# Patient Record
Sex: MALE | Race: WHITE | NOT HISPANIC OR LATINO | Employment: OTHER | URBAN - METROPOLITAN AREA
[De-identification: names, ages, dates, MRNs, and addresses within clinical notes are randomized per-mention and may not be internally consistent; named-entity substitution may affect disease eponyms.]

---

## 2017-01-25 ENCOUNTER — APPOINTMENT (OUTPATIENT)
Dept: PHYSICAL THERAPY | Facility: CLINIC | Age: 52
End: 2017-01-25
Payer: COMMERCIAL

## 2017-01-25 PROCEDURE — 97161 PT EVAL LOW COMPLEX 20 MIN: CPT

## 2017-01-27 ENCOUNTER — APPOINTMENT (OUTPATIENT)
Dept: PHYSICAL THERAPY | Facility: CLINIC | Age: 52
End: 2017-01-27
Payer: COMMERCIAL

## 2017-01-27 PROCEDURE — 97012 MECHANICAL TRACTION THERAPY: CPT

## 2017-01-27 PROCEDURE — 97140 MANUAL THERAPY 1/> REGIONS: CPT

## 2017-01-27 PROCEDURE — 97110 THERAPEUTIC EXERCISES: CPT

## 2017-01-30 ENCOUNTER — APPOINTMENT (OUTPATIENT)
Dept: PHYSICAL THERAPY | Facility: CLINIC | Age: 52
End: 2017-01-30
Payer: COMMERCIAL

## 2017-01-30 PROCEDURE — 97012 MECHANICAL TRACTION THERAPY: CPT

## 2017-01-30 PROCEDURE — 97140 MANUAL THERAPY 1/> REGIONS: CPT

## 2017-01-30 PROCEDURE — 97110 THERAPEUTIC EXERCISES: CPT

## 2017-02-01 ENCOUNTER — APPOINTMENT (OUTPATIENT)
Dept: PHYSICAL THERAPY | Facility: CLINIC | Age: 52
End: 2017-02-01
Payer: COMMERCIAL

## 2017-02-01 PROCEDURE — 97110 THERAPEUTIC EXERCISES: CPT

## 2017-02-01 PROCEDURE — 97012 MECHANICAL TRACTION THERAPY: CPT

## 2017-02-01 PROCEDURE — 97140 MANUAL THERAPY 1/> REGIONS: CPT

## 2017-02-03 ENCOUNTER — APPOINTMENT (OUTPATIENT)
Dept: PHYSICAL THERAPY | Facility: CLINIC | Age: 52
End: 2017-02-03
Payer: COMMERCIAL

## 2017-02-03 PROCEDURE — 97110 THERAPEUTIC EXERCISES: CPT

## 2017-02-03 PROCEDURE — 97140 MANUAL THERAPY 1/> REGIONS: CPT

## 2017-02-03 PROCEDURE — 97014 ELECTRIC STIMULATION THERAPY: CPT

## 2017-02-03 PROCEDURE — G0283 ELEC STIM OTHER THAN WOUND: HCPCS

## 2017-02-06 ENCOUNTER — APPOINTMENT (OUTPATIENT)
Dept: PHYSICAL THERAPY | Facility: CLINIC | Age: 52
End: 2017-02-06
Payer: COMMERCIAL

## 2017-02-06 PROCEDURE — 97014 ELECTRIC STIMULATION THERAPY: CPT

## 2017-02-06 PROCEDURE — G0283 ELEC STIM OTHER THAN WOUND: HCPCS

## 2017-02-06 PROCEDURE — 97140 MANUAL THERAPY 1/> REGIONS: CPT

## 2017-02-06 PROCEDURE — 97110 THERAPEUTIC EXERCISES: CPT

## 2017-02-07 ENCOUNTER — GENERIC CONVERSION - ENCOUNTER (OUTPATIENT)
Dept: OTHER | Facility: OTHER | Age: 52
End: 2017-02-07

## 2017-02-08 ENCOUNTER — APPOINTMENT (OUTPATIENT)
Dept: PHYSICAL THERAPY | Facility: CLINIC | Age: 52
End: 2017-02-08
Payer: COMMERCIAL

## 2017-02-10 ENCOUNTER — APPOINTMENT (OUTPATIENT)
Dept: PHYSICAL THERAPY | Facility: CLINIC | Age: 52
End: 2017-02-10
Payer: COMMERCIAL

## 2017-02-10 PROCEDURE — G0283 ELEC STIM OTHER THAN WOUND: HCPCS

## 2017-02-10 PROCEDURE — 97140 MANUAL THERAPY 1/> REGIONS: CPT

## 2017-02-10 PROCEDURE — 97014 ELECTRIC STIMULATION THERAPY: CPT

## 2017-02-10 PROCEDURE — 97110 THERAPEUTIC EXERCISES: CPT

## 2017-02-13 ENCOUNTER — APPOINTMENT (OUTPATIENT)
Dept: PHYSICAL THERAPY | Facility: CLINIC | Age: 52
End: 2017-02-13
Payer: COMMERCIAL

## 2017-02-13 PROCEDURE — 97140 MANUAL THERAPY 1/> REGIONS: CPT

## 2017-02-13 PROCEDURE — 97014 ELECTRIC STIMULATION THERAPY: CPT

## 2017-02-13 PROCEDURE — G0283 ELEC STIM OTHER THAN WOUND: HCPCS

## 2017-02-13 PROCEDURE — 97110 THERAPEUTIC EXERCISES: CPT

## 2017-02-15 ENCOUNTER — APPOINTMENT (OUTPATIENT)
Dept: PHYSICAL THERAPY | Facility: CLINIC | Age: 52
End: 2017-02-15
Payer: COMMERCIAL

## 2017-02-15 PROCEDURE — 97110 THERAPEUTIC EXERCISES: CPT

## 2017-02-15 PROCEDURE — 97014 ELECTRIC STIMULATION THERAPY: CPT

## 2017-02-15 PROCEDURE — 97140 MANUAL THERAPY 1/> REGIONS: CPT

## 2017-02-15 PROCEDURE — G0283 ELEC STIM OTHER THAN WOUND: HCPCS

## 2017-02-17 ENCOUNTER — APPOINTMENT (OUTPATIENT)
Dept: PHYSICAL THERAPY | Facility: CLINIC | Age: 52
End: 2017-02-17
Payer: COMMERCIAL

## 2017-02-17 PROCEDURE — 97140 MANUAL THERAPY 1/> REGIONS: CPT

## 2017-02-17 PROCEDURE — 97110 THERAPEUTIC EXERCISES: CPT

## 2017-02-20 ENCOUNTER — APPOINTMENT (OUTPATIENT)
Dept: PHYSICAL THERAPY | Facility: CLINIC | Age: 52
End: 2017-02-20
Payer: COMMERCIAL

## 2017-02-21 ENCOUNTER — GENERIC CONVERSION - ENCOUNTER (OUTPATIENT)
Dept: OTHER | Facility: OTHER | Age: 52
End: 2017-02-21

## 2017-02-22 ENCOUNTER — APPOINTMENT (OUTPATIENT)
Dept: PHYSICAL THERAPY | Facility: CLINIC | Age: 52
End: 2017-02-22
Payer: COMMERCIAL

## 2017-02-24 ENCOUNTER — APPOINTMENT (OUTPATIENT)
Dept: PHYSICAL THERAPY | Facility: CLINIC | Age: 52
End: 2017-02-24
Payer: COMMERCIAL

## 2017-03-06 ENCOUNTER — GENERIC CONVERSION - ENCOUNTER (OUTPATIENT)
Dept: OTHER | Facility: OTHER | Age: 52
End: 2017-03-06

## 2017-09-22 ENCOUNTER — GENERIC CONVERSION - ENCOUNTER (OUTPATIENT)
Dept: OTHER | Facility: OTHER | Age: 52
End: 2017-09-22

## 2017-09-22 ENCOUNTER — HOSPITAL ENCOUNTER (OUTPATIENT)
Dept: RADIOLOGY | Facility: HOSPITAL | Age: 52
Discharge: HOME | End: 2017-09-22
Payer: COMMERCIAL

## 2017-09-22 ENCOUNTER — TRANSCRIBE ORDERS (OUTPATIENT)
Dept: ADMINISTRATIVE | Facility: HOSPITAL | Age: 52
End: 2017-09-22

## 2017-09-22 DIAGNOSIS — R10.9 ABDOMINAL PAIN: ICD-10-CM

## 2017-09-22 DIAGNOSIS — E55.9 VITAMIN D DEFICIENCY: ICD-10-CM

## 2017-09-22 DIAGNOSIS — R06.83 SNORING: Primary | ICD-10-CM

## 2017-09-22 DIAGNOSIS — R53.83 FATIGUE, UNSPECIFIED TYPE: ICD-10-CM

## 2017-09-22 DIAGNOSIS — R53.83 OTHER FATIGUE: ICD-10-CM

## 2017-09-22 DIAGNOSIS — R07.9 CHEST PAIN: ICD-10-CM

## 2017-09-22 PROCEDURE — 71020 HB CHEST X-RAY 2VW FRONTAL&LATL: CPT

## 2017-09-22 PROCEDURE — 76700 US EXAM ABDOM COMPLETE: CPT

## 2017-09-26 ENCOUNTER — LAB CONVERSION - ENCOUNTER (OUTPATIENT)
Dept: OTHER | Facility: OTHER | Age: 52
End: 2017-09-26

## 2017-09-26 LAB
A/G RATIO (HISTORICAL): 1.5 (CALC) (ref 1–2.5)
ALBUMIN SERPL BCP-MCNC: 4.3 G/DL (ref 3.6–5.1)
ALP SERPL-CCNC: 72 U/L (ref 40–115)
ALT SERPL W P-5'-P-CCNC: 48 U/L (ref 9–46)
AMYLASE (HISTORICAL): 70 U/L (ref 21–101)
AST SERPL W P-5'-P-CCNC: 28 U/L (ref 10–35)
BILIRUB SERPL-MCNC: 0.6 MG/DL (ref 0.2–1.2)
BUN SERPL-MCNC: 13 MG/DL (ref 7–25)
BUN/CREA RATIO (HISTORICAL): ABNORMAL (CALC) (ref 6–22)
CALCIUM SERPL-MCNC: 9.4 MG/DL (ref 8.6–10.3)
CHLORIDE SERPL-SCNC: 103 MMOL/L (ref 98–110)
CO2 SERPL-SCNC: 26 MMOL/L (ref 20–31)
CREAT SERPL-MCNC: 0.89 MG/DL (ref 0.7–1.33)
DEPRECATED RDW RBC AUTO: 13.1 % (ref 11–15)
EGFR AFRICAN AMERICAN (HISTORICAL): 114 ML/MIN/1.73M2
EGFR-AMERICAN CALC (HISTORICAL): 98 ML/MIN/1.73M2
GAMMA GLOBULIN (HISTORICAL): 2.8 G/DL (CALC) (ref 1.9–3.7)
GLUCOSE (HISTORICAL): 101 MG/DL (ref 65–99)
HCT VFR BLD AUTO: 47.9 % (ref 38.5–50)
HGB BLD-MCNC: 16.2 G/DL (ref 13.2–17.1)
LIPASE SERPL-CCNC: 57 U/L (ref 7–60)
MCH RBC QN AUTO: 30.7 PG (ref 27–33)
MCHC RBC AUTO-ENTMCNC: 33.8 G/DL (ref 32–36)
MCV RBC AUTO: 90.9 FL (ref 80–100)
PLATELET # BLD AUTO: 180 THOUSAND/UL (ref 140–400)
PMV BLD AUTO: 10.7 FL (ref 7.5–12.5)
POTASSIUM SERPL-SCNC: 4.5 MMOL/L (ref 3.5–5.3)
RBC # BLD AUTO: 5.27 MILLION/UL (ref 4.2–5.8)
SODIUM SERPL-SCNC: 140 MMOL/L (ref 135–146)
TOTAL PROTEIN (HISTORICAL): 7.1 G/DL (ref 6.1–8.1)
WBC # BLD AUTO: 7.8 THOUSAND/UL (ref 3.8–10.8)

## 2017-09-27 ENCOUNTER — LAB CONVERSION - ENCOUNTER (OUTPATIENT)
Dept: OTHER | Facility: OTHER | Age: 52
End: 2017-09-27

## 2017-09-27 LAB
25(OH)D3 SERPL-MCNC: 26 NG/ML (ref 30–100)
LYME IGG/IGM AB (HISTORICAL): <0.9 INDEX

## 2017-10-03 ENCOUNTER — GENERIC CONVERSION - ENCOUNTER (OUTPATIENT)
Dept: OTHER | Facility: OTHER | Age: 52
End: 2017-10-03

## 2017-10-11 ENCOUNTER — ALLSCRIPTS OFFICE VISIT (OUTPATIENT)
Dept: OTHER | Facility: OTHER | Age: 52
End: 2017-10-11

## 2017-10-13 ENCOUNTER — GENERIC CONVERSION - ENCOUNTER (OUTPATIENT)
Dept: OTHER | Facility: OTHER | Age: 52
End: 2017-10-13

## 2017-10-23 ENCOUNTER — ANESTHESIA (OUTPATIENT)
Dept: GASTROENTEROLOGY | Facility: HOSPITAL | Age: 52
End: 2017-10-23
Payer: COMMERCIAL

## 2017-10-23 ENCOUNTER — ANESTHESIA EVENT (OUTPATIENT)
Dept: GASTROENTEROLOGY | Facility: HOSPITAL | Age: 52
End: 2017-10-23
Payer: COMMERCIAL

## 2017-10-23 ENCOUNTER — HOSPITAL ENCOUNTER (OUTPATIENT)
Facility: HOSPITAL | Age: 52
Setting detail: OUTPATIENT SURGERY
Discharge: HOME | End: 2017-10-23
Attending: INTERNAL MEDICINE | Admitting: INTERNAL MEDICINE
Payer: COMMERCIAL

## 2017-10-23 VITALS
TEMPERATURE: 96.1 F | WEIGHT: 250 LBS | SYSTOLIC BLOOD PRESSURE: 132 MMHG | DIASTOLIC BLOOD PRESSURE: 71 MMHG | BODY MASS INDEX: 35 KG/M2 | HEIGHT: 71 IN | RESPIRATION RATE: 16 BRPM | HEART RATE: 78 BPM | OXYGEN SATURATION: 95 %

## 2017-10-23 DIAGNOSIS — R10.13 EPIGASTRIC PAIN: ICD-10-CM

## 2017-10-23 DIAGNOSIS — K86.1 OTHER CHRONIC PANCREATITIS (HCC): ICD-10-CM

## 2017-10-23 PROCEDURE — 88342 IMHCHEM/IMCYTCHM 1ST ANTB: CPT | Performed by: INTERNAL MEDICINE

## 2017-10-23 PROCEDURE — 88305 TISSUE EXAM BY PATHOLOGIST: CPT | Performed by: INTERNAL MEDICINE

## 2017-10-23 RX ORDER — MIDAZOLAM HYDROCHLORIDE 1 MG/ML
INJECTION INTRAMUSCULAR; INTRAVENOUS AS NEEDED
Status: DISCONTINUED | OUTPATIENT
Start: 2017-10-23 | End: 2017-10-23 | Stop reason: SURG

## 2017-10-23 RX ORDER — FENTANYL CITRATE 50 UG/ML
INJECTION, SOLUTION INTRAMUSCULAR; INTRAVENOUS AS NEEDED
Status: DISCONTINUED | OUTPATIENT
Start: 2017-10-23 | End: 2017-10-23 | Stop reason: SURG

## 2017-10-23 RX ORDER — LIDOCAINE HYDROCHLORIDE 10 MG/ML
INJECTION, SOLUTION INFILTRATION; PERINEURAL AS NEEDED
Status: DISCONTINUED | OUTPATIENT
Start: 2017-10-23 | End: 2017-10-23 | Stop reason: SURG

## 2017-10-23 RX ORDER — PROPOFOL 10 MG/ML
INJECTION, EMULSION INTRAVENOUS CONTINUOUS PRN
Status: DISCONTINUED | OUTPATIENT
Start: 2017-10-23 | End: 2017-10-23 | Stop reason: SURG

## 2017-10-23 RX ORDER — MULTIVITAMIN
1 CAPSULE ORAL DAILY
COMMUNITY

## 2017-10-23 RX ORDER — KETAMINE HYDROCHLORIDE 50 MG/ML
INJECTION, SOLUTION, CONCENTRATE INTRAMUSCULAR; INTRAVENOUS AS NEEDED
Status: DISCONTINUED | OUTPATIENT
Start: 2017-10-23 | End: 2017-10-23 | Stop reason: SURG

## 2017-10-23 RX ORDER — GLYCOPYRROLATE 0.2 MG/ML
INJECTION INTRAMUSCULAR; INTRAVENOUS AS NEEDED
Status: DISCONTINUED | OUTPATIENT
Start: 2017-10-23 | End: 2017-10-23 | Stop reason: SURG

## 2017-10-23 RX ORDER — PROPOFOL 10 MG/ML
INJECTION, EMULSION INTRAVENOUS AS NEEDED
Status: DISCONTINUED | OUTPATIENT
Start: 2017-10-23 | End: 2017-10-23 | Stop reason: SURG

## 2017-10-23 RX ORDER — SODIUM CHLORIDE 9 MG/ML
125 INJECTION, SOLUTION INTRAVENOUS CONTINUOUS
Status: DISCONTINUED | OUTPATIENT
Start: 2017-10-23 | End: 2017-10-23 | Stop reason: HOSPADM

## 2017-10-23 RX ADMIN — FENTANYL CITRATE 25 MCG: 50 INJECTION, SOLUTION INTRAMUSCULAR; INTRAVENOUS at 09:22

## 2017-10-23 RX ADMIN — FENTANYL CITRATE 25 MCG: 50 INJECTION, SOLUTION INTRAMUSCULAR; INTRAVENOUS at 09:10

## 2017-10-23 RX ADMIN — LIDOCAINE HYDROCHLORIDE 50 MG: 10 INJECTION, SOLUTION INFILTRATION; PERINEURAL at 09:11

## 2017-10-23 RX ADMIN — PROPOFOL 100 MCG/KG/MIN: 10 INJECTION, EMULSION INTRAVENOUS at 09:11

## 2017-10-23 RX ADMIN — SODIUM CHLORIDE: 0.9 INJECTION, SOLUTION INTRAVENOUS at 09:10

## 2017-10-23 RX ADMIN — GLYCOPYRROLATE 0.2 MG: 0.2 INJECTION, SOLUTION INTRAMUSCULAR; INTRAVENOUS at 09:11

## 2017-10-23 RX ADMIN — SODIUM CHLORIDE 125 ML/HR: 0.9 INJECTION, SOLUTION INTRAVENOUS at 09:02

## 2017-10-23 RX ADMIN — PROPOFOL 100 MG: 10 INJECTION, EMULSION INTRAVENOUS at 09:11

## 2017-10-23 RX ADMIN — KETAMINE HYDROCHLORIDE 25 MG: 50 INJECTION, SOLUTION INTRAMUSCULAR; INTRAVENOUS at 09:11

## 2017-10-23 RX ADMIN — MIDAZOLAM HYDROCHLORIDE 2 MG: 1 INJECTION, SOLUTION INTRAMUSCULAR; INTRAVENOUS at 09:10

## 2017-10-23 NOTE — OP NOTE
**** GI/ENDOSCOPY REPORT ****     PATIENT NAME: Luz Marina Montesinos - VISIT ID:  Patient ID:   [id number] YOB: 1965     INTRODUCTION: Upper Endoscopic [ULTRASOUND] - A 47 male patient presents   for an outpatient Upper Endoscopic [ULTRASOUND] at [facility]  INDICATIONS: Abdominal pain centered in the epigastrium  GERD  Dysphagia  Hx pancreatitis with CBD stone  CONSENT: The benefits, risks, and alternatives to the procedure were   discussed and informed consent was obtained from the patient  PREPARATION:  EKG, pulse, pulse oximetry and blood pressure were monitored   throughout the procedure  ASA Classification: Class 2 - Patient has mild   to moderate systemic disturbance that may or may not be related to the   disorder requiring surgery  MEDICATIONS: Anesthesia-check records     PROCEDURE:  The ultrasound gastroscope was passed with ease through the   mouth under direct visualization and advanced to the 3rd portion of the   duodenum  The scope was withdrawn and the mucosa was carefully examined  The views were good  The patient's toleration of the procedure was good  FINDINGS: Visual Exam:   Esophagus: Short-segment Soliman's esophagus was   found in the distal third of the esophagus  Multiple biopsies was taken  Multiple biopsies was taken  GE junction: There was a hiatus hernia   visible in the GE junction  Stomach: Multiple biopsies was taken  A   single polyp (measuring 6 mm in size) was found in the antrum  The polyp   was removed by polypectomy with a cold snare  Duodenum: The duodenum   appeared to be normal   EUS EXAM: The CBD and PD were not dilated  There   were no stones in the CBD or PD  There were no lesion in the head, body,   or tail of the pancreas and there was no evidence of chronic pancreatitis  There were no enlarged lymph nodes in the celiac area and no lesions seen   in the examined part of the liver   The gallbladder was surgically absent  COMPLICATIONS: There were no complications  IMPRESSIONS: Soliman's esophagus noted  Multiple biopsies taken  A hiatus   hernia found  A polyp found in the antrum  Polypectomy was performed with   cold snare  Normal duodenum  Normal pancreas and CBD without CBD stone  Surgically absent gallbladder  RECOMMENDATIONS: Anti-reflux measures: Raise the head of the bed 4 to 6   inches  Avoid smoking  Avoid excess coffee, tea or other caffeinated   beverages  Avoid garments that fit tightly through the abdomen  Avoid   eating before bed  Follow-up on the results of the biopsy specimens  Follow-up appointment with Dr Raul Clay  ESTIMATED BLOOD LOSS:     PATHOLOGY SPECIMENS: Multiple biopsies taken  Associated finding:   Soliman's esophagus  Multiple biopsies taken  Multiple biopsies taken  Polypectomy performed using a snare  PROCEDURE CODES:     ICD-9 Codes: 789 06 Abdominal pain, epigastric 530 81 Esophageal reflux   787 2 DYSPHAGIA 530 85 Soliman's esophagus 553 3 Diaphragmatic hernia   without mention of obstruction or gangrene 211 1 Benign neoplasm of stomach     ICD-10 Codes: R10 13 Epigastric pain K21 Gastro-esophageal reflux disease   R13 10 Dysphagia, unspecified K22 7 Soliman's esophagus K44 Diaphragmatic   hernia R56 2 Neoplasm of uncertain behavior of stomach     PERFORMED BY: DENISE Muse  on 10/23/2017  Version 1, electronically signed by DENISE Lindo  on 10/23/2017   at 10:57

## 2017-10-23 NOTE — ANESTHESIA PREPROCEDURE EVALUATION
Review of Systems/Medical History  Patient summary reviewed        Cardiovascular   Pulmonary  Sleep apnea , ,        GI/Hepatic            Endo/Other     GYN       Hematology   Musculoskeletal  Obesity ,        Neurology   Psychology                Anesthesia Plan  ASA Score- 2       Anesthesia Type- IV sedation with anesthesia with ASA Monitors  Additional Monitors:   Airway Plan:           Induction- intravenous  Informed Consent- Anesthetic plan and risks discussed with patient

## 2017-10-23 NOTE — DISCHARGE INSTRUCTIONS
Abdominal Pain   AMBULATORY CARE:   Abdominal pain  can be dull, achy, or sharp  You may have pain in one area of your abdomen, or in your entire abdomen  Your pain may be caused by a condition such as constipation, food sensitivity or poisoning, infection, or a blockage  Abdominal pain can also be from a hernia, appendicitis, or an ulcer  Liver, gallbladder, or kidney conditions can also cause abdominal pain  The cause of your abdominal pain may be unknown  Seek care immediately if:   · You have new chest pain or shortness of breath  · You have pulsing pain in your upper abdomen or lower back that suddenly becomes constant  · Your pain is in the right lower abdominal area and worsens with movement  · You have a fever over 100 4°F (38°C) or shaking chills  · You are vomiting and cannot keep food or liquids down  · Your pain does not improve or gets worse over the next 8 to 12 hours  · You see blood in your vomit or bowel movements, or they look black and tarry  · Your skin or the whites of your eyes turn yellow  · You are a woman and have a large amount of vaginal bleeding that is not your monthly period  Contact your healthcare provider if:   · You have pain in your lower back  · You are a man and have pain in your testicles  · You have pain when you urinate  · You have questions or concerns about your condition or care  Treatment for abdominal pain  may include medicine to calm your stomach, prevent vomiting, or decrease pain  Follow up with your healthcare provider as directed:  Write down your questions so you remember to ask them during your visits  © 2017 [employer]  Information is for End User's use only and may not be sold, redistributed or otherwise used for commercial purposes  All illustrations and images included in CareNotes® are the copyrighted property of [employer]  or [employer] Kauffman    The above information is an educational aid only  It is not intended as medical advice for individual conditions or treatments  Talk to your doctor, nurse or pharmacist before following any medical regimen to see if it is safe and effective for you

## 2017-10-23 NOTE — ANESTHESIA POSTPROCEDURE EVALUATION
Post-Op Assessment Note      CV Status:  Stable    Mental Status:  Alert and awake    Hydration Status:  Euvolemic and stable    PONV Controlled:  None    Airway Patency:  Patent    Post Op Vitals Reviewed: Yes          Staff: CRNA           BP      Temp     Pulse     Resp      SpO2

## 2017-10-31 NOTE — CONSULTS
Assessment  1  Gastroesophageal reflux disease, esophagitis presence not specified (530 81) (K21 9)    Plan  Abdominal pain, epigastric    · NexIUM 24HR 20 MG Oral Capsule Delayed Release (Esomeprazole Magnesium)   Rx By: Brooklynn Payer; Dispense: 20 Days ; #:40 Capsule Delayed Release; Refill: 0;Abdominal pain, epigastric; GREGORY = N; Dispense Sample; Last Updated By: Oscar Balderrama; 10/11/2017 10:23:41 AM  Gastroesophageal reflux disease, esophagitis presence not specified    · 2 - Geena Olsen MD, Rosy Mckoy (Gastroenterology) Co-Management  *  Status: Hold For -Scheduling  Requested for: [id number]   Ordered;Gastroesophageal reflux disease, esophagitis presence not specified; Ordered By: Jilda Nageotte Performed:  Due: 86NCM4417  Care Summary provided  : Yes    Discussion/Summary  Discussion Summary:   79-year-old white male with what sounds like gastroesophageal reflux symptoms  He does have heartburn and stuff coming up to his mouth at night which goes along with reflux disease  He may also have some scarring with the history of food getting stuck  I told the 1st thing to do is get another endoscopy by Dr Geena Olsen who has done his previous scope  Once we get that information will know more what is going on  I told him if there is a stricture that he would probably dilate it at that sitting  I briefly talked to him about what anti-reflux surgery would be  Counseling Documentation With Imm: The patient was counseled regarding  Goals and Barriers: The patient has the current Goals: Identify source of pain and difficulty swallowing  The patent has the current Barriers: None  Patient's Capacity to Self-Care: Patient is able to Self-Care  Medication SE Review and Pt Understands Tx: The treatment plan was reviewed with the patient/guardian   The patient/guardian understands and agrees with the treatment plan   Self Referrals:   Self Referrals: No Referred by Dr Erica Sibley  Chief Complaint Free Text Note Form: Consult epigastric pain  History of Present Illness  HPI: 53-year-old male who is being complaining of epigastric pain  Fairly sharp and then moved around both sites and then feels like a vice  He also notices when he eats or drinks that things seemed to get stuck at about that region  He does notice heartburn and stuff coming up at night  He was started on a PPI by his family doctor and feels a little bit better  No weight loss  Bowel movements are not is normal was that used to be      Review of Systems  Complete-Male:  Constitutional: No fever or chills, feels well, no tiredness, no recent weight gain or weight loss  Eyes: No complaints of eye pain, no red eyes, no discharge from eyes, no itchy eyes  ENT: no complaints of earache, no hearing loss, no nosebleeds, no nasal discharge, no sore throat, no hoarseness  Cardiovascular: No complaints of slow heart rate, no fast heart rate, no chest pain, no palpitations, no leg claudication, no lower extremity  Respiratory: No complaints of shortness of breath, no wheezing, no cough, no SOB on exertion, no orthopnea or PND  Gastrointestinal: as noted in HPI  Genitourinary: No complaints of dysuria, no incontinence, no hesitancy, no nocturia, no genital lesion, no testicular pain  Musculoskeletal: No complaints of arthralgia, no myalgias, no joint swelling or stiffness, no limb pain or swelling  Integumentary: No complaints of skin rash or skin lesions, no itching, no skin wound, no dry skin  Neurological: No compliants of headache, no confusion, no convulsions, no numbness or tingling, no dizziness or fainting, no limb weakness, no difficulty walking  Psychiatric: Is not suicidal, no sleep disturbances, no anxiety or depression, no change in personality, no emotional problems  Endocrine: No complaints of proptosis, no hot flashes, no muscle weakness, no erectile dysfunction, no deepening of the voice, no feelings of weakness  Hematologic/Lymphatic: No complaints of swollen glands, no swollen glands in the neck, does not bleed easily, no easy bruising  Active Problems  1  Abdominal pain (789 00) (R10 9)   2  Abdominal pain, epigastric (789 06) (R10 13)   3  Apnea (786 03) (R06 81)   4  Back pain (724 5) (M54 9)   5  Chest pain (786 50) (R07 9)   6  Chest tightness or pressure (786 59) (R07 89)   7  Cognitive impairment (294 9) (R41 89)   8  Dysphagia (787 20) (R13 10)   9  Fatigue (780 79) (R53 83)   10  Has stopped breathing (786 03) (R06 81)   11  Hyperglycemia (790 29) (R73 9)   12  Sinusitis (473 9) (J32 9)   13  Snoring (786 09) (R06 83)   14  Tick bite (919 4,E906 4) (W57 XXXA)   15  Vitamin D deficiency (268 9) (E55 9)    Past Medical History  1  History of acute pancreatitis (V12 79) (Z87 19)  Active Problems And Past Medical History Reviewed: The active problems and past medical history were reviewed and updated today  Surgical History  1  History of Cholecystectomy  Surgical History Reviewed: The surgical history was reviewed and updated today  Family History  Mother    1  Family history of cardiac disorder (V17 49) (Z82 49)   2  Family history of diabetes mellitus (V18 0) (Z83 3)  Father    3  Family history of    4  Family history of coronary arteriosclerosis (V17 3) (Z82 49)  Family History Reviewed: The family history was reviewed and updated today  Social History     ·    · Full-time employment   · Never A Smoker   · Occasional alcohol use  Social History Reviewed: The social history was reviewed and updated today  Current Meds   1  Gisselle Low Dose 81 MG Oral Tablet Chewable; CHEW AND SWALLOW 1 TABLET DAILY; Therapy: 41ZDL1334 to Recorded   2  Biotin 1 MG Oral Capsule; Therapy: 38WPS2735 to Recorded   3  Magnesium 200 MG Oral Tablet; Therapy: 17PTZ7997 to Recorded   4  Melatonin 1 MG Oral Capsule; Therapy: 63IQJ0927 to Recorded   5   NexIUM 24HR 20 MG Oral Capsule Delayed Release; Take 1 capsule twice daily; Therapy: 93LHY2465 to (77 873 836); Last Rx:[id number] Ordered   6  Potassimin 75 MG Oral Tablet; Therapy: 60BFG2182 to Recorded   7  PriLOSEC OTC 20 MG Oral Tablet Delayed Release; Therapy: 26JOU2570 to Recorded   8  Super B Complex Maxi Oral Tablet; TAKE 1 TABLET DAILY; Therapy: 03JYI0157 to Recorded   9  Vitamin D (Ergocalciferol) 35035 UNIT Oral Capsule; TAKE 1 CAPSULE BY MOUTH WEEKLY; Therapy: 28EQU9609 to (Evaluate:46Yay3299) Recorded    Allergies    1  No Known Drug Allergies    Physical Exam   Constitutional  General appearance: No acute distress, well appearing and well nourished  Eyes  Conjunctiva and lids: No swelling, erythema, or discharge  Ears, Nose, Mouth, and Throat  External inspection of ears and nose: Normal    Neck  Supple, symmetric, trachea midline, no masses  Pulmonary  Respiratory effort: No increased work of breathing or signs of respiratory distress  Auscultation of lungs: Clear to auscultation, equal breath sounds bilaterally, no wheezes, no rales, no rhonci  Cardiovascular  Auscultation of heart: Normal rate and rhythm, normal S1 and S2, without murmurs  Examination of extremities for edema and/or varicosities: Normal    Carotid pulses: Normal    Abdomen  Abdomen: Non-tender, no masses  Liver and spleen: No hepatomegaly or splenomegaly  Lymphatic  Palpation of lymph nodes in neck: No lymphadenopathy  Musculoskeletal  Gait and station: Normal    Neurologic  Sensation: Motor and sensory grossly intact     Psychiatric  Orientation to person, place and time: Normal    Mood and affect: Normal        Signatures   Electronically signed by : Gigi Foley MD; Oct 11 2017 12:13PM EST                       (Author)

## 2017-11-02 ENCOUNTER — GENERIC CONVERSION - ENCOUNTER (OUTPATIENT)
Dept: OTHER | Facility: OTHER | Age: 52
End: 2017-11-02

## 2017-11-03 ENCOUNTER — GENERIC CONVERSION - ENCOUNTER (OUTPATIENT)
Dept: OTHER | Facility: OTHER | Age: 52
End: 2017-11-03

## 2018-01-15 NOTE — RESULT NOTES
Discussion/Summary   Please let him know the polyp in his stomach was an adenoma  Reasonable to repeat EGD in 1 year to check for an remove any additional gastric polyps  Verified Results  (1) TISSUE EXAM 50MBC1995 09:34AM Roro Naidu     Test Name Result Flag Reference   LAB AP CASE REPORT (Report)     Surgical Pathology Report             Case: [id number]                   Authorizing Provider: Jordan Wu MD      Collected:      10/23/2017 0934        Ordering Location:   [address] [place] [address]   Received:      10/23/2017 1316 [facility]                               Pathologist:      Hanane Castro MD                              Specimens:  A) - Stomach, gastric bx-cold                                     B) - Polyp, Stomach/Small Intestine, antrum polyp-cold snare                      C) - Esophagus, distal esophagus bx-cold                                D) - Esophagus, proximal esophagus bx-cold   LAB AP FINAL DIAGNOSIS (Report)     A  Stomach, biopsy:    - Chronic inactive antral gastritis  - Immunostain for H  pylori is negative  - No intestinal metaplasia, dysplasia or neoplasia identified  B  Stomach, antrum, polyp:    - Fundic gland polyp     - Adenomatous polyp     - No intestinal metaplasia, high grade dysplasia or carcinoma   identified  C  Esophagus, distal, biopsy:    - Cardiac gastric and squamous junctional mucosa with mild chronic   inflammation     - No intestinal metaplasia, dysplasia or neoplasia identified  D  Esophagus, proximal, biopsy:    - Squamous esophageal mucosa without significant pathologic   abnormalities  - No intraepithelial eosinophils identified     - No dysplasia or neoplasia identified  Electronically signed by Hanane Castro MD on 11/1/2017 at 4:43 PM   LAB AP SURGICAL ADDITIONAL INFORMATION (Report)     All controls performed with the immunohistochemical stains reported above   reacted appropriately   These tests were developed and their performance   characteristics determined by [facility] or   [employer]  They may not be cleared or approved by the [place]  Food and Drug Administration  The FDA has determined that such clearance   or approval is not necessary  These tests are used for clinical purposes  They should not be regarded as investigational or for research  This   laboratory has been approved by [place] 88, designated as a high-complexity   laboratory and is qualified to perform these tests  - Interpretation performed at [facility], [employer]   LAB AP GROSS DESCRIPTION (Report)     A  The specimen is received in formalin, labeled with the patient's name   and hospital number, and is designated gastric biopsy  The specimen   consists of 3 tan soft tissue fragments measuring 0 1, 0 2, and 0 2 cm in   greatest dimension  Entirely submitted in one cassette  B  The specimen is received in formalin, labeled with the patient's name   and hospital number, and is designated antrum polyp  The specimen   consists of 2 tan soft tissue fragments measuring 0 2 and 0 4 cm in   greatest dimension  Entirely submitted in one cassette  C  The specimen is received in formalin, labeled with the patient's name   and hospital number, and is designated distal esophagus biopsy  The   specimen consists of 2 tan soft tissue fragments measuring 0 2 and 0 4 cm   in greatest dimension  Entirely submitted in one cassette  D  The specimen is received in formalin, labeled with the patient's name   and hospital number, and is designated proximal esophagus biopsy  The   specimen consists of multiple tan-white soft tissue fragments measuring in   loose aggregate 0 5 x 0 2 x 0 1 cm  Entirely submitted in one cassette      Note: The estimated total formalin fixation time based upon information   provided by the submitting clinician and the standard processing schedule   is less than 72 hours      AEK   LAB AP CLINICAL INFORMATION      R/O H Pylori gastritis   R/O H Pylori  gastritis

## 2018-01-22 VITALS
WEIGHT: 251 LBS | HEART RATE: 83 BPM | BODY MASS INDEX: 35.14 KG/M2 | SYSTOLIC BLOOD PRESSURE: 132 MMHG | DIASTOLIC BLOOD PRESSURE: 84 MMHG | TEMPERATURE: 98.1 F | HEIGHT: 71 IN | OXYGEN SATURATION: 96 %

## 2018-04-30 DIAGNOSIS — M54.50 ACUTE RIGHT-SIDED LOW BACK PAIN WITHOUT SCIATICA: Primary | ICD-10-CM

## 2018-05-09 ENCOUNTER — HOSPITAL ENCOUNTER (OUTPATIENT)
Dept: RADIOLOGY | Age: 53
Discharge: HOME | End: 2018-05-09
Payer: COMMERCIAL

## 2018-05-09 DIAGNOSIS — M54.50 ACUTE RIGHT-SIDED LOW BACK PAIN WITHOUT SCIATICA: ICD-10-CM

## 2018-05-09 PROCEDURE — 77073 BONE LENGTH STUDIES: CPT

## 2018-08-13 ENCOUNTER — APPOINTMENT (EMERGENCY)
Dept: RADIOLOGY | Facility: HOSPITAL | Age: 53
End: 2018-08-13
Payer: COMMERCIAL

## 2018-08-13 ENCOUNTER — HOSPITAL ENCOUNTER (EMERGENCY)
Facility: HOSPITAL | Age: 53
Discharge: HOME | End: 2018-08-13
Attending: EMERGENCY MEDICINE | Admitting: EMERGENCY MEDICINE
Payer: COMMERCIAL

## 2018-08-13 VITALS
HEART RATE: 75 BPM | SYSTOLIC BLOOD PRESSURE: 145 MMHG | TEMPERATURE: 98.5 F | OXYGEN SATURATION: 95 % | DIASTOLIC BLOOD PRESSURE: 90 MMHG | RESPIRATION RATE: 20 BRPM

## 2018-08-13 DIAGNOSIS — R53.81 MALAISE AND FATIGUE: Primary | ICD-10-CM

## 2018-08-13 DIAGNOSIS — R53.83 MALAISE AND FATIGUE: Primary | ICD-10-CM

## 2018-08-13 LAB
ALBUMIN SERPL BCP-MCNC: 3.2 G/DL (ref 3.5–5)
ALP SERPL-CCNC: 71 U/L (ref 46–116)
ALT SERPL W P-5'-P-CCNC: 39 U/L (ref 12–78)
ANION GAP SERPL CALCULATED.3IONS-SCNC: 6 MMOL/L (ref 4–13)
AST SERPL W P-5'-P-CCNC: 18 U/L (ref 5–45)
ATRIAL RATE: 61 BPM
BASOPHILS # BLD AUTO: 0.03 THOUSANDS/ΜL (ref 0–0.1)
BASOPHILS NFR BLD AUTO: 0 % (ref 0–1)
BILIRUB SERPL-MCNC: 0.72 MG/DL (ref 0.2–1)
BUN SERPL-MCNC: 12 MG/DL (ref 5–25)
CALCIUM SERPL-MCNC: 8.4 MG/DL (ref 8.3–10.1)
CHLORIDE SERPL-SCNC: 103 MMOL/L (ref 100–108)
CK SERPL-CCNC: 144 U/L (ref 39–308)
CO2 SERPL-SCNC: 26 MMOL/L (ref 21–32)
CREAT SERPL-MCNC: 0.89 MG/DL (ref 0.6–1.3)
EOSINOPHIL # BLD AUTO: 0.2 THOUSAND/ΜL (ref 0–0.61)
EOSINOPHIL NFR BLD AUTO: 2 % (ref 0–6)
ERYTHROCYTE [DISTWIDTH] IN BLOOD BY AUTOMATED COUNT: 12.3 % (ref 11.6–15.1)
GFR SERPL CREATININE-BSD FRML MDRD: 98 ML/MIN/1.73SQ M
GLUCOSE SERPL-MCNC: 121 MG/DL (ref 65–140)
HCT VFR BLD AUTO: 43.5 % (ref 36.5–49.3)
HGB BLD-MCNC: 14.9 G/DL (ref 12–17)
IMM GRANULOCYTES # BLD AUTO: 0.04 THOUSAND/UL (ref 0–0.2)
IMM GRANULOCYTES NFR BLD AUTO: 1 % (ref 0–2)
LYMPHOCYTES # BLD AUTO: 1.21 THOUSANDS/ΜL (ref 0.6–4.47)
LYMPHOCYTES NFR BLD AUTO: 14 % (ref 14–44)
MCH RBC QN AUTO: 30.5 PG (ref 26.8–34.3)
MCHC RBC AUTO-ENTMCNC: 34.3 G/DL (ref 31.4–37.4)
MCV RBC AUTO: 89 FL (ref 82–98)
MONOCYTES # BLD AUTO: 1.06 THOUSAND/ΜL (ref 0.17–1.22)
MONOCYTES NFR BLD AUTO: 12 % (ref 4–12)
NEUTROPHILS # BLD AUTO: 6.13 THOUSANDS/ΜL (ref 1.85–7.62)
NEUTS SEG NFR BLD AUTO: 71 % (ref 43–75)
NRBC BLD AUTO-RTO: 0 /100 WBCS
P AXIS: 56 DEGREES
PLATELET # BLD AUTO: 155 THOUSANDS/UL (ref 149–390)
PMV BLD AUTO: 10 FL (ref 8.9–12.7)
POTASSIUM SERPL-SCNC: 4.2 MMOL/L (ref 3.5–5.3)
PR INTERVAL: 184 MS
PROT SERPL-MCNC: 6.9 G/DL (ref 6.4–8.2)
QRS AXIS: 73 DEGREES
QRSD INTERVAL: 84 MS
QT INTERVAL: 398 MS
QTC INTERVAL: 400 MS
RBC # BLD AUTO: 4.89 MILLION/UL (ref 3.88–5.62)
SODIUM SERPL-SCNC: 135 MMOL/L (ref 136–145)
T WAVE AXIS: 63 DEGREES
TROPONIN I SERPL-MCNC: <0.02 NG/ML
VENTRICULAR RATE: 61 BPM
WBC # BLD AUTO: 8.67 THOUSAND/UL (ref 4.31–10.16)

## 2018-08-13 PROCEDURE — 96360 HYDRATION IV INFUSION INIT: CPT

## 2018-08-13 PROCEDURE — 80053 COMPREHEN METABOLIC PANEL: CPT | Performed by: EMERGENCY MEDICINE

## 2018-08-13 PROCEDURE — 99285 EMERGENCY DEPT VISIT HI MDM: CPT

## 2018-08-13 PROCEDURE — 84484 ASSAY OF TROPONIN QUANT: CPT | Performed by: EMERGENCY MEDICINE

## 2018-08-13 PROCEDURE — 85025 COMPLETE CBC W/AUTO DIFF WBC: CPT | Performed by: EMERGENCY MEDICINE

## 2018-08-13 PROCEDURE — 96361 HYDRATE IV INFUSION ADD-ON: CPT

## 2018-08-13 PROCEDURE — 36415 COLL VENOUS BLD VENIPUNCTURE: CPT | Performed by: EMERGENCY MEDICINE

## 2018-08-13 PROCEDURE — 93005 ELECTROCARDIOGRAM TRACING: CPT

## 2018-08-13 PROCEDURE — 71046 X-RAY EXAM CHEST 2 VIEWS: CPT

## 2018-08-13 PROCEDURE — 82550 ASSAY OF CK (CPK): CPT | Performed by: EMERGENCY MEDICINE

## 2018-08-13 PROCEDURE — 93010 ELECTROCARDIOGRAM REPORT: CPT | Performed by: INTERNAL MEDICINE

## 2018-08-13 RX ORDER — ASPIRIN 81 MG/1
81 TABLET ORAL DAILY
COMMUNITY
End: 2020-07-09 | Stop reason: ALTCHOICE

## 2018-08-13 RX ORDER — AMOXICILLIN AND CLAVULANATE POTASSIUM 875; 125 MG/1; MG/1
1 TABLET, FILM COATED ORAL EVERY 12 HOURS SCHEDULED
COMMUNITY
Start: 2018-08-07 | End: 2018-08-14

## 2018-08-13 RX ADMIN — SODIUM CHLORIDE 1000 ML: 0.9 INJECTION, SOLUTION INTRAVENOUS at 07:22

## 2018-08-13 RX ADMIN — SODIUM CHLORIDE 1000 ML: 0.9 INJECTION, SOLUTION INTRAVENOUS at 06:01

## 2018-08-13 NOTE — ED PROVIDER NOTES
History  Chief Complaint   Patient presents with    Shortness of Breath     Body aches chills and congestion  Started on Saturday  HPI  47 y o  Male presents to the ED with body aches and chills x 2 days  Pt states he has pain in all of his joints and behind his eyes  Pain is not positional and pt feels like he cannot get comfortable  He had difficulty sleeping last night due to pain  Some relief with 600mg ibuprofen at 0330  Pt notes associated fatigue, chills, decreased appetite, and "stomach upset "  Also states dry cough and shortness of breath  Denies chest pain, chest tightness, nausea, vomiting, diarrhea, urinary symptoms, rash  Pt is light sensitive, but denies visual changes  He did not measure temperature at home  No sick contacts  Pt is concerned he may be dehydrated because 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 Taking? Multiple Vitamin (MULTIVITAMIN) capsule   Yes No   Sig: Take 1 capsule by mouth daily   esomeprazole (NexIUM) 20 mg capsule   Yes No   Sig: Take 20 mg by mouth 2 (two) times a day before meals      Facility-Administered Medications: None       Past Medical History:   Diagnosis Date    GERD (gastroesophageal reflux disease)        Past Surgical History:   Procedure Laterality Date    [place] EDG US EXAM SURGICAL ALTER STOM DUODENUM/JEJUNUM N/A 10/23/2017    Procedure: RADIAL ENDOSCOPIC U/S;  Surgeon: Yamilka Gonzalez MD;  Location: [facility]; Service: Gastroenterology       History reviewed  No pertinent family history  I have reviewed and agree with the history as documented  Social History   Substance Use Topics    Smoking status: Never Smoker    Smokeless tobacco: Never Used    Alcohol use Yes      Comment: social        Review of Systems   Constitutional: Positive for appetite change, chills and fatigue  Negative for fever  HENT: Positive for congestion  Negative for rhinorrhea and sore throat  Eyes: Negative for photophobia (light sensitivity) and visual disturbance  Respiratory: Positive for cough and shortness of breath  Negative for chest tightness  Cardiovascular: Negative for chest pain and palpitations  Gastrointestinal: Negative for abdominal pain, nausea and vomiting  Genitourinary: Negative for dysuria, frequency and hematuria  Musculoskeletal: Positive for arthralgias and myalgias  Skin: Negative for rash  Neurological: Positive for headaches  Negative for dizziness, weakness, light-headedness and numbness  All other systems reviewed and are negative  Physical Exam  ED Triage Vitals [08/13/18 0522]   Temperature Pulse Respirations Blood Pressure SpO2   98 5 °F (36 9 °C) 78 20 157/93 95 %      Temp Source Heart Rate Source Patient Position - Orthostatic VS BP Location FiO2 (%)   Oral Monitor Sitting Left arm --      Pain Score       7           Orthostatic Vital Signs  Vitals:    08/13/18 0522   BP: 157/93   Pulse: 78   Patient Position - Orthostatic VS: Sitting       Physical Exam   Constitutional: He is oriented to person, place, and time  He appears well-developed and well-nourished  No distress  HENT:   Head: Normocephalic and atraumatic  Right Ear: External ear normal    Left Ear: External ear normal    Mouth/Throat: Oropharynx is clear and moist    Eyes: Conjunctivae are normal  Pupils are equal, round, and reactive to light  Neck: Normal range of motion  Neck supple  Cardiovascular: Normal rate, regular rhythm, normal heart sounds and intact distal pulses  Exam reveals no gallop and no friction rub  No murmur heard  Pulmonary/Chest: Effort normal and breath sounds normal  No respiratory distress  He has no wheezes  He has no rhonchi  He has no rales  Abdominal: Soft  Bowel sounds are normal  He exhibits no distension  There is no tenderness     Musculoskeletal: Normal range of motion  He exhibits no tenderness  Lymphadenopathy:     He has no cervical adenopathy  Neurological: He is alert and oriented to person, place, and time  He has normal strength  No cranial nerve deficit or sensory deficit  Skin: Skin is warm and dry  No rash noted  He is not diaphoretic  ED Medications  Medications   sodium chloride 0 9 % bolus 1,000 mL (not administered)   sodium chloride 0 9 % bolus 1,000 mL (1,000 mL Intravenous New Bag 8/13/18 0601)       Diagnostic Studies  Results Reviewed     Procedure Component Value Units Date/Time    Troponin I [[id number]]  (Normal) Collected:  08/13/18 0600    Lab Status:  Final result Specimen:  Blood from Arm, Left Updated:  08/13/18 0631     Troponin I <0 02 ng/mL     Comprehensive metabolic panel [[id number]]  (Abnormal) Collected:  08/13/18 0600    Lab Status:  Final result Specimen:  Blood from Arm, Left Updated:  08/13/18 1322     Sodium 135 (L) mmol/L      Potassium 4 2 mmol/L      Chloride 103 mmol/L      CO2 26 mmol/L      Anion Gap 6 mmol/L      BUN 12 mg/dL      Creatinine 0 89 mg/dL      Glucose 121 mg/dL      Calcium 8 4 mg/dL      AST 18 U/L      ALT 39 U/L      Alkaline Phosphatase 71 U/L      Total Protein 6 9 g/dL      Albumin 3 2 (L) g/dL      Total Bilirubin 0 72 mg/dL      eGFR 98 ml/min/1 73sq m     Narrative:         National Kidney Disease Education Program recommendations are as follows:  GFR calculation is accurate only with a steady state creatinine  Chronic Kidney disease less than 60 ml/min/1 73 sq  meters  Kidney failure less than 15 ml/min/1 73 sq  meters      CK (with reflex to MB) [[id number]]  (Normal) Collected:  08/13/18 0600    Lab Status:  Final result Specimen:  Blood from Arm, Left Updated:  08/13/18 0629     Total  U/L     CBC and differential [[id number]] Collected:  08/13/18 0600    Lab Status:  Final result Specimen:  Blood from Arm, Left Updated:  08/13/18 0620     WBC 8 67 Thousand/uL      RBC 4 89 Million/uL Hemoglobin 14 9 g/dL      Hematocrit 43 5 %      MCV 89 fL      MCH 30 5 pg      MCHC 34 3 g/dL      RDW 12 3 %      MPV 10 0 fL      Platelets 072 Thousands/uL      nRBC 0 /100 WBCs      Neutrophils Relative 71 %      Immat GRANS % 1 %      Lymphocytes Relative 14 %      Monocytes Relative 12 %      Eosinophils Relative 2 %      Basophils Relative 0 %      Neutrophils Absolute 6 13 Thousands/µL      Immature Grans Absolute 0 04 Thousand/uL      Lymphocytes Absolute 1 21 Thousands/µL      Monocytes Absolute 1 06 Thousand/µL      Eosinophils Absolute 0 20 Thousand/µL      Basophils Absolute 0 03 Thousands/µL                  XR chest 2 views   ED Interpretation by Roberta Dhillon MD (83/12 3041)   No cardiopulmonary abnormality            Procedures  ECG 12 Lead Documentation  Date/Time: 8/13/2018 6:03 AM  Performed by: Azalia Denis  Authorized by: Azalia Denis     ECG reviewed by me, the ED Provider: yes    Patient location:  ED  Previous ECG:     Previous ECG:  Unavailable  Interpretation:     Interpretation: normal    Rate:     ECG rate:  61    ECG rate assessment: normal    Rhythm:     Rhythm: sinus rhythm    Ectopy:     Ectopy: none    QRS:     QRS axis:  Normal  Conduction:     Conduction: normal    ST segments:     ST segments:  Normal  T waves:     T waves: normal            Phone Consults  ED Phone Contact    ED Course  ED Course as of Aug 13 0708   Mon Aug 13, 2018   0707 Labs and CXR wnl  Will give IVF and discharge with follow up precautions  MDM  Number of Diagnoses or Management Options  Diagnosis management comments: 47 y o  Male with body aches, cough, and shortness of breath  Will check CBC, CMP, Trop, CXR to evaluate for cardiac abnormality, pneumonia, anemia  Will get CK to evaluate for rhabdomyolysis    If all negative can discharge with PCP followup    CritCare Time    Disposition  Final diagnoses:   Malaise and fatigue     Time reflects when diagnosis was documented in both MDM as applicable and the Disposition within this note     Time User Action Codes Description Comment    8/13/2018  6:42 AM Luberta Fitting Add [J06 9] Viral URI     8/13/2018  6:58 AM Luberta Fitting Remove [J06 9] Viral URI     8/13/2018  6:58 AM Luberta Fitting Add [R53 81,  R53 83] Malaise and fatigue       ED Disposition     ED Disposition Condition Comment    Discharge  [facility] discharge to home/self care  Condition at discharge: Stable        Follow-up Information     Follow up With Specialties Details Why [address], DO Family Medicine In 3 days If symptoms worsen [address] [place] [address] [phone]            Patient's Medications   Discharge Prescriptions    No medications on file     No discharge procedures on file  ED Provider  Attending physically available and evaluated Reagan Razo I managed the patient along with the ED Attending      Electronically Signed by         Marcella Herrmann MD  08/13/18 4336

## 2018-08-13 NOTE — ED ATTENDING ATTESTATION
Jayant Maza DO, saw and evaluated the patient  I have discussed the patient with the resident/non-physician practitioner and agree with the resident's/non-physician practitioner's findings, Plan of Care, and MDM as documented in the resident's/non-physician practitioner's note, except where noted  All available labs and Radiology studies were reviewed  At this point I agree with the current assessment done in the Emergency Department  I have conducted an independent evaluation of this patient including a focused history and a physical exam     ED Note - Maulik Elizabeth 47 y o  male MRN: [id number]  Unit/Bed#: ED 03 Encounter: [id number]    History of Present Illness   HPI  Maulik Elizabeth is a 47 y o  male who presents for evaluation of General malaise as well as fatigue  Patient has had the symptoms for approximately 1 week stating that he does not feel any better despite a day of rest yesterday  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  Furthermore, the patient did spend 2 days, both Friday and Saturday of this past weekend, at [employer] walking around for multiple hours in the heat  He did spend the day yesterday trying to recuperate and rest and actively hydrating with Pedialyte and other liquid beverages  Patient presents today with persistent fatigue  Denies any fever chills  Does complain of diffuse body aching as well as a pressure-type frontal headache that was gradual in onset and not debilitating  Patient states that the headache seems to be exacerbated by eye movement but denies any visual deficits or focal neurological deficits  Patient does complain of mild right-sided cervical paraspinal muscle pain but denies any nuchal rigidity or stiffness  No obvious tick exposures  No recent injury  No recent illness  No obvious sick contacts    Patient denies any chest pain, shortness of breath, nausea or vomiting, dizziness, lightheadedness, edema or swelling  No PND, orthopnea or dyspnea on exertion  REVIEW OF SYSTEMS  See HPI for further details  12 systems reviewed and otherwise negative except as noted  Historical Information     PAST MEDICAL HISTORY  Past Medical History:   Diagnosis Date    GERD (gastroesophageal reflux disease)        FAMILY HISTORY  History reviewed  No pertinent family history  SOCIAL HISTORY  Social History     Social History    Marital status: Single     Spouse name: N/A    Number of children: N/A    Years of education: N/A     Social History Main Topics    Smoking status: Never Smoker    Smokeless tobacco: Never Used    Alcohol use Yes      Comment: social    Drug use: Unknown    Sexual activity: Not Asked     Other Topics Concern    None     Social History Narrative    None       SURGICAL HISTORY  Past Surgical History:   Procedure Laterality Date    [place] EDG US EXAM SURGICAL ALTER STOM DUODENUM/JEJUNUM N/A 10/23/2017    Procedure: RADIAL ENDOSCOPIC U/S;  Surgeon: Lin Fyae MD;  Location: [facility]; Service: Gastroenterology     Meds/Allergies     CURRENT MEDICATIONS    Current Facility-Administered Medications:     sodium chloride 0 9 % bolus 1,000 mL, 1,000 mL, Intravenous, Once, Roberta Dhillon MD, Last Rate: 1,000 mL/hr at 08/13/18 0722, 1,000 mL at 08/13/18 4041    Current Outpatient Prescriptions:     esomeprazole (NexIUM) 20 mg capsule, Take 20 mg by mouth 2 (two) times a day before meals, Disp: , Rfl:     Multiple Vitamin (MULTIVITAMIN) capsule, Take 1 capsule by mouth daily, Disp: , Rfl:     (Not in a hospital admission)    ALLERGIES  No Known Allergies  Objective     PHYSICAL EXAM    VITAL SIGNS: Blood pressure 157/93, pulse 78, temperature 98 5 °F (36 9 °C), temperature source Oral, resp  rate 20, SpO2 95 %      Constitutional:  Appears well developed and well nourished, no acute distress, non-toxic appearance   Eyes:  PERRL, EOMI, conjunctivae pink, sclerae non-icteric, no nystagmus, optic discs sharp/ no papilledema    HENT:  Normocephalic/Atraumatic, no rhinorrhea, mucous membranes moist   Neck: normal range of motion, +ve right sided cervical paraspinal muscle tenderness, supple   Respiratory:  No respiratory distress, normal breath sounds   Cardiovascular:  Normal rate, normal rhythm, no murmurs, no gallops, no rubs, peripheral pulses intact, no carotid bruits, no JVD  GI:  Soft, non-tender, non-distended, normal bowel sounds, no organomegaly, no mass, no rebound, no guarding   :  No CVAT, no flank ecchymosis  Musculoskeletal:  No swelling or edema, no tenderness, no deformities  Integument:  Pink, warm, dry, Well hydrated, no rash, no erythema, no bullae   Lymphatic:  No cervical/ tonsillar/ submandibular lymphadenopathy noted   Neurologic:  Awake, Alert & oriented x 3, CN 2-12 intact, no focal neurological deficits, motor function intact, strength 5/5 all extremities, normal sensory function, reflexes within normal limits, intact finger to nose, intact heal to shin, negative dysdiadochokinesia  Able to ambulate  Psychiatric:  Speech and behavior appropriate       ED COURSE and MDM:    Assessment/Plan   Assessment:  Salvador Lemon is a 47 y o  male presents for evaluation of General malaise and fatigue  Plan:  Labs, IV fluids, imaging prn, symptom management, disposition as appropriate  CRITICAL CARE TIME: 0 minutes      Portions of the record may have been created with voice recognition software  Occasional wrong word or "sound a like" substitutions may have occurred due to the inherent limitations of voice recognition software       ED Provider  Electronically Signed by

## 2018-08-13 NOTE — ED RE-EVALUATION NOTE
Pt  Feeling much better  Ambulating without difficulty  Will d/c home       Pratima [address], DO  08/13/18 5503

## 2018-08-13 NOTE — DISCHARGE INSTRUCTIONS
You were evaluated in the emergency department today for your body aches and shortness of breath  Your chest X-ray, EKG, and blood work showed no evidence of infection, electrolyte abnormality, or cardiac abnormality  Take ibuprofen for pain as needed  Follow up with your primary care physician if symptoms persist  Return to the emergency department for fever, chest pain, worsening shortness of breath, any other new or concerning symptoms  Fatigue   WHAT YOU NEED TO KNOW:   Fatigue is mental and physical exhaustion that does not get better with rest  Fatigue may make daily activities difficult or cause extreme sleepiness  It is normal to feel tired sometimes, but long-term fatigue may be a sign of serious illness  DISCHARGE INSTRUCTIONS:   Return to the emergency department if:   · You have chest pain  · You have difficulty breathing  Contact your healthcare provider if:   · You have a cough that gets worse, or does not go away  · You see blood in your urine or bowel movement  · You have numbness or tingling around your mouth or in an arm or leg  · You faint, feel dizzy, or have vision changes  · You have swelling in your lymph nodes  · You are a woman and have vaginal bleeding that is not normal for you, or is not expected  · You lose weight without trying, or you have trouble eating  · You feel weak or have muscle pain  · You have pain or swelling in your joints  · You have questions or concerns about your condition or care  Follow up with your healthcare provider as directed: You may need more tests  Your healthcare provider may also refer you to a specialist  Write down your questions so you remember to ask them during your visits  Manage fatigue:   · Keep a fatigue diary  Include anything that makes you feel more tired or less tired  Bring the diary with you to follow-up visits with your provider  · Exercise as directed  Exercise can help you feel more alert  Exercise can also help you manage stress or relieve depression  Try to get at least 30 minutes of exercise most days of the week  · Keep a regular sleep schedule  Go to bed and wake up at the same times every day  Limit naps to 1 hour each day  A nap can improve fatigue, but a long nap may make it harder to go to sleep at night  · Plan and limit your activities  Limit the number of activities such as shopping and cleaning you do each day  If possible, try to spread out your trips throughout the week  Plan ahead so you are not rushing to get something done  Only do activities that you have the energy to complete  Take breaks between activities  Ask for help if you need it  Another person may be able to drive you or help with daily activities  · Eat a variety of healthy foods  Healthy foods include fruits, vegetables, whole-grain breads, low-fat dairy products, beans, lean meats, and fish  Good nutrition can help manage fatigue  · Limit caffeine and alcohol  These can make it difficult to fall or stay asleep  Women should limit alcohol to 1 drink a day  Men should limit alcohol to 2 drinks a day  A drink of alcohol is 12 ounces of beer, 5 ounces of wine, or 1½ ounces of liquor  Ask our healthcare provider how much caffeine is safe for you  · Do not smoke  Nicotine and other chemicals in cigarettes and cigars can cause lung damage and increase fatigue  Ask your healthcare provider for information if you currently smoke and need help to quit  E-cigarettes or smokeless tobacco still contain nicotine  Talk to your healthcare provider before you use these products  © 2017 [address]  Information is for End User's use only and may not be sold, redistributed or otherwise used for commercial purposes  All illustrations and images included in CareNotes® are the copyrighted property of [employer]  or Junito Kauffman  The above information is an  only   It is not intended as medical advice for individual conditions or treatments  Talk to your doctor, nurse or pharmacist before following any medical regimen to see if it is safe and effective for you

## 2018-08-15 ENCOUNTER — HOSPITAL ENCOUNTER (EMERGENCY)
Facility: HOSPITAL | Age: 53
Discharge: HOME | End: 2018-08-16
Attending: EMERGENCY MEDICINE | Admitting: EMERGENCY MEDICINE
Payer: COMMERCIAL

## 2018-08-15 DIAGNOSIS — H92.09: Primary | ICD-10-CM

## 2018-08-15 DIAGNOSIS — H92.01 POSTERIOR AURICULAR PAIN OF RIGHT EAR: ICD-10-CM

## 2018-08-15 LAB
BASOPHILS # BLD AUTO: 0.03 THOUSANDS/ΜL (ref 0–0.1)
BASOPHILS NFR BLD AUTO: 0 % (ref 0–1)
EOSINOPHIL # BLD AUTO: 0.17 THOUSAND/ΜL (ref 0–0.61)
EOSINOPHIL NFR BLD AUTO: 2 % (ref 0–6)
ERYTHROCYTE [DISTWIDTH] IN BLOOD BY AUTOMATED COUNT: 12.6 % (ref 11.6–15.1)
HCT VFR BLD AUTO: 43.9 % (ref 36.5–49.3)
HGB BLD-MCNC: 15.5 G/DL (ref 12–17)
IMM GRANULOCYTES # BLD AUTO: 0.06 THOUSAND/UL (ref 0–0.2)
IMM GRANULOCYTES NFR BLD AUTO: 1 % (ref 0–2)
LYMPHOCYTES # BLD AUTO: 1.97 THOUSANDS/ΜL (ref 0.6–4.47)
LYMPHOCYTES NFR BLD AUTO: 24 % (ref 14–44)
MCH RBC QN AUTO: 31.6 PG (ref 26.8–34.3)
MCHC RBC AUTO-ENTMCNC: 35.3 G/DL (ref 31.4–37.4)
MCV RBC AUTO: 89 FL (ref 82–98)
MONOCYTES # BLD AUTO: 1.07 THOUSAND/ΜL (ref 0.17–1.22)
MONOCYTES NFR BLD AUTO: 13 % (ref 4–12)
NEUTROPHILS # BLD AUTO: 5.01 THOUSANDS/ΜL (ref 1.85–7.62)
NEUTS SEG NFR BLD AUTO: 60 % (ref 43–75)
NRBC BLD AUTO-RTO: 0 /100 WBCS
PLATELET # BLD AUTO: 206 THOUSANDS/UL (ref 149–390)
PMV BLD AUTO: 10.6 FL (ref 8.9–12.7)
RBC # BLD AUTO: 4.91 MILLION/UL (ref 3.88–5.62)
WBC # BLD AUTO: 8.31 THOUSAND/UL (ref 4.31–10.16)

## 2018-08-15 PROCEDURE — 93005 ELECTROCARDIOGRAM TRACING: CPT

## 2018-08-15 PROCEDURE — 96361 HYDRATE IV INFUSION ADD-ON: CPT

## 2018-08-15 PROCEDURE — 85025 COMPLETE CBC W/AUTO DIFF WBC: CPT | Performed by: EMERGENCY MEDICINE

## 2018-08-15 PROCEDURE — 96374 THER/PROPH/DIAG INJ IV PUSH: CPT

## 2018-08-15 PROCEDURE — 36415 COLL VENOUS BLD VENIPUNCTURE: CPT | Performed by: EMERGENCY MEDICINE

## 2018-08-15 PROCEDURE — 96376 TX/PRO/DX INJ SAME DRUG ADON: CPT

## 2018-08-15 PROCEDURE — 80053 COMPREHEN METABOLIC PANEL: CPT | Performed by: EMERGENCY MEDICINE

## 2018-08-15 RX ORDER — MORPHINE SULFATE 4 MG/ML
4 INJECTION, SOLUTION INTRAMUSCULAR; INTRAVENOUS ONCE
Status: COMPLETED | OUTPATIENT
Start: 2018-08-15 | End: 2018-08-15

## 2018-08-15 RX ADMIN — MORPHINE SULFATE 4 MG: 4 INJECTION, SOLUTION INTRAMUSCULAR; INTRAVENOUS at 22:21

## 2018-08-15 RX ADMIN — SODIUM CHLORIDE 1000 ML: 0.9 INJECTION, SOLUTION INTRAVENOUS at 22:43

## 2018-08-15 RX ADMIN — MORPHINE SULFATE 4 MG: 4 INJECTION INTRAVENOUS at 23:15

## 2018-08-16 ENCOUNTER — HOSPITAL ENCOUNTER (EMERGENCY)
Facility: HOSPITAL | Age: 53
Discharge: HOME | End: 2018-08-16
Attending: EMERGENCY MEDICINE
Payer: COMMERCIAL

## 2018-08-16 ENCOUNTER — APPOINTMENT (EMERGENCY)
Dept: RADIOLOGY | Facility: HOSPITAL | Age: 53
End: 2018-08-16
Payer: COMMERCIAL

## 2018-08-16 VITALS
RESPIRATION RATE: 16 BRPM | OXYGEN SATURATION: 93 % | TEMPERATURE: 99.7 F | SYSTOLIC BLOOD PRESSURE: 132 MMHG | HEART RATE: 76 BPM | DIASTOLIC BLOOD PRESSURE: 73 MMHG

## 2018-08-16 VITALS
TEMPERATURE: 100.2 F | DIASTOLIC BLOOD PRESSURE: 72 MMHG | HEART RATE: 78 BPM | RESPIRATION RATE: 20 BRPM | SYSTOLIC BLOOD PRESSURE: 126 MMHG | OXYGEN SATURATION: 95 %

## 2018-08-16 DIAGNOSIS — M54.81 OCCIPITAL NEURALGIA OF RIGHT SIDE: Primary | ICD-10-CM

## 2018-08-16 LAB
ALBUMIN SERPL BCP-MCNC: 3.7 G/DL (ref 3.5–5)
ALP SERPL-CCNC: 77 U/L (ref 46–116)
ALT SERPL W P-5'-P-CCNC: 46 U/L (ref 12–78)
ANION GAP SERPL CALCULATED.3IONS-SCNC: 7 MMOL/L (ref 4–13)
AST SERPL W P-5'-P-CCNC: 23 U/L (ref 5–45)
ATRIAL RATE: 88 BPM
BILIRUB SERPL-MCNC: 0.45 MG/DL (ref 0.2–1)
BUN SERPL-MCNC: 13 MG/DL (ref 5–25)
CALCIUM SERPL-MCNC: 8.5 MG/DL (ref 8.3–10.1)
CHLORIDE SERPL-SCNC: 104 MMOL/L (ref 100–108)
CO2 SERPL-SCNC: 26 MMOL/L (ref 21–32)
CREAT SERPL-MCNC: 0.96 MG/DL (ref 0.6–1.3)
GFR SERPL CREATININE-BSD FRML MDRD: 90 ML/MIN/1.73SQ M
GLUCOSE SERPL-MCNC: 114 MG/DL (ref 65–140)
P AXIS: 51 DEGREES
POTASSIUM SERPL-SCNC: 3.6 MMOL/L (ref 3.5–5.3)
PR INTERVAL: 178 MS
PROT SERPL-MCNC: 7.7 G/DL (ref 6.4–8.2)
QRS AXIS: 79 DEGREES
QRSD INTERVAL: 86 MS
QT INTERVAL: 332 MS
QTC INTERVAL: 401 MS
SODIUM SERPL-SCNC: 137 MMOL/L (ref 136–145)
T WAVE AXIS: 50 DEGREES
VENTRICULAR RATE: 88 BPM

## 2018-08-16 PROCEDURE — 70481 CT ORBIT/EAR/FOSSA W/DYE: CPT

## 2018-08-16 PROCEDURE — 93010 ELECTROCARDIOGRAM REPORT: CPT | Performed by: INTERNAL MEDICINE

## 2018-08-16 PROCEDURE — 96376 TX/PRO/DX INJ SAME DRUG ADON: CPT

## 2018-08-16 PROCEDURE — 96375 TX/PRO/DX INJ NEW DRUG ADDON: CPT

## 2018-08-16 PROCEDURE — 96372 THER/PROPH/DIAG INJ SC/IM: CPT

## 2018-08-16 PROCEDURE — 99283 EMERGENCY DEPT VISIT LOW MDM: CPT

## 2018-08-16 PROCEDURE — 96365 THER/PROPH/DIAG IV INF INIT: CPT

## 2018-08-16 PROCEDURE — 99284 EMERGENCY DEPT VISIT MOD MDM: CPT

## 2018-08-16 RX ORDER — KETOROLAC TROMETHAMINE 30 MG/ML
30 INJECTION, SOLUTION INTRAMUSCULAR; INTRAVENOUS ONCE
Status: COMPLETED | OUTPATIENT
Start: 2018-08-16 | End: 2018-08-16

## 2018-08-16 RX ORDER — MORPHINE SULFATE 2 MG/ML
2 INJECTION, SOLUTION INTRAMUSCULAR; INTRAVENOUS ONCE
Status: COMPLETED | OUTPATIENT
Start: 2018-08-16 | End: 2018-08-16

## 2018-08-16 RX ORDER — DIPHENHYDRAMINE HYDROCHLORIDE 50 MG/ML
25 INJECTION INTRAMUSCULAR; INTRAVENOUS ONCE
Status: COMPLETED | OUTPATIENT
Start: 2018-08-16 | End: 2018-08-16

## 2018-08-16 RX ORDER — HYDROCODONE BITARTRATE AND ACETAMINOPHEN 5; 325 MG/1; MG/1
2 TABLET ORAL ONCE
Status: COMPLETED | OUTPATIENT
Start: 2018-08-16 | End: 2018-08-16

## 2018-08-16 RX ORDER — BUPIVACAINE HYDROCHLORIDE 5 MG/ML
5 INJECTION, SOLUTION EPIDURAL; INTRACAUDAL ONCE
Status: COMPLETED | OUTPATIENT
Start: 2018-08-16 | End: 2018-08-16

## 2018-08-16 RX ORDER — OXYCODONE HYDROCHLORIDE AND ACETAMINOPHEN 5; 325 MG/1; MG/1
1 TABLET ORAL EVERY 4 HOURS PRN
Qty: 10 TABLET | Refills: 0 | Status: SHIPPED | OUTPATIENT
Start: 2018-08-16 | End: 2018-08-20 | Stop reason: ALTCHOICE

## 2018-08-16 RX ORDER — MAGNESIUM SULFATE HEPTAHYDRATE 40 MG/ML
2 INJECTION, SOLUTION INTRAVENOUS ONCE
Status: COMPLETED | OUTPATIENT
Start: 2018-08-16 | End: 2018-08-16

## 2018-08-16 RX ORDER — OXYCODONE HYDROCHLORIDE 5 MG/1
5 TABLET ORAL EVERY 4 HOURS PRN
Qty: 7 TABLET | Refills: 0 | Status: SHIPPED | OUTPATIENT
Start: 2018-08-16 | End: 2018-08-20 | Stop reason: ALTCHOICE

## 2018-08-16 RX ORDER — MORPHINE SULFATE 4 MG/ML
4 INJECTION, SOLUTION INTRAMUSCULAR; INTRAVENOUS ONCE
Status: COMPLETED | OUTPATIENT
Start: 2018-08-16 | End: 2018-08-16

## 2018-08-16 RX ORDER — BUPIVACAINE HYDROCHLORIDE 2.5 MG/ML
5 INJECTION, SOLUTION EPIDURAL; INFILTRATION; INTRACAUDAL ONCE
Status: DISCONTINUED | OUTPATIENT
Start: 2018-08-16 | End: 2018-08-16

## 2018-08-16 RX ORDER — AMOXICILLIN AND CLAVULANATE POTASSIUM 875; 125 MG/1; MG/1
1 TABLET, FILM COATED ORAL EVERY 12 HOURS
Qty: 14 TABLET | Refills: 0 | Status: SHIPPED | OUTPATIENT
Start: 2018-08-16 | End: 2018-08-16 | Stop reason: HOSPADM

## 2018-08-16 RX ORDER — METOCLOPRAMIDE HYDROCHLORIDE 5 MG/ML
10 INJECTION INTRAMUSCULAR; INTRAVENOUS ONCE
Status: COMPLETED | OUTPATIENT
Start: 2018-08-16 | End: 2018-08-16

## 2018-08-16 RX ORDER — GABAPENTIN 300 MG/1
300 CAPSULE ORAL 3 TIMES DAILY
Qty: 21 CAPSULE | Refills: 0 | Status: SHIPPED | OUTPATIENT
Start: 2018-08-16 | End: 2018-08-20 | Stop reason: SDUPTHER

## 2018-08-16 RX ORDER — GABAPENTIN 300 MG/1
300 CAPSULE ORAL ONCE
Status: COMPLETED | OUTPATIENT
Start: 2018-08-16 | End: 2018-08-16

## 2018-08-16 RX ORDER — LIDOCAINE HYDROCHLORIDE 20 MG/ML
5 INJECTION, SOLUTION EPIDURAL; INFILTRATION; INTRACAUDAL; PERINEURAL ONCE
Status: DISCONTINUED | OUTPATIENT
Start: 2018-08-16 | End: 2018-08-16

## 2018-08-16 RX ORDER — KETOROLAC TROMETHAMINE 30 MG/ML
15 INJECTION, SOLUTION INTRAMUSCULAR; INTRAVENOUS ONCE
Status: COMPLETED | OUTPATIENT
Start: 2018-08-16 | End: 2018-08-16

## 2018-08-16 RX ORDER — METHYLPREDNISOLONE SODIUM SUCCINATE 125 MG/2ML
125 INJECTION, POWDER, LYOPHILIZED, FOR SOLUTION INTRAMUSCULAR; INTRAVENOUS ONCE
Status: COMPLETED | OUTPATIENT
Start: 2018-08-16 | End: 2018-08-16

## 2018-08-16 RX ORDER — METHYLPREDNISOLONE 4 MG/1
TABLET ORAL
Qty: 21 TABLET | Refills: 0 | Status: SHIPPED | OUTPATIENT
Start: 2018-08-16 | End: 2020-07-09 | Stop reason: ALTCHOICE

## 2018-08-16 RX ADMIN — METOCLOPRAMIDE 10 MG: 5 INJECTION, SOLUTION INTRAMUSCULAR; INTRAVENOUS at 13:04

## 2018-08-16 RX ADMIN — HYDROCODONE BITARTRATE AND ACETAMINOPHEN 2 TABLET: 5; 325 TABLET ORAL at 05:36

## 2018-08-16 RX ADMIN — IOHEXOL 100 ML: 350 INJECTION, SOLUTION INTRAVENOUS at 01:34

## 2018-08-16 RX ADMIN — MAGNESIUM SULFATE HEPTAHYDRATE 2 G: 40 INJECTION, SOLUTION INTRAVENOUS at 13:14

## 2018-08-16 RX ADMIN — MORPHINE SULFATE 4 MG: 4 INJECTION INTRAVENOUS at 04:37

## 2018-08-16 RX ADMIN — DIPHENHYDRAMINE HYDROCHLORIDE 25 MG: 50 INJECTION, SOLUTION INTRAMUSCULAR; INTRAVENOUS at 12:54

## 2018-08-16 RX ADMIN — BUPIVACAINE HYDROCHLORIDE: 5 INJECTION, SOLUTION EPIDURAL; INTRACAUDAL at 13:22

## 2018-08-16 RX ADMIN — MORPHINE SULFATE 2 MG: 2 INJECTION, SOLUTION INTRAMUSCULAR; INTRAVENOUS at 00:55

## 2018-08-16 RX ADMIN — SODIUM CHLORIDE 1000 ML: 0.9 INJECTION, SOLUTION INTRAVENOUS at 12:52

## 2018-08-16 RX ADMIN — KETOROLAC TROMETHAMINE 30 MG: 30 INJECTION, SOLUTION INTRAMUSCULAR at 12:55

## 2018-08-16 RX ADMIN — BUPIVACAINE HYDROCHLORIDE 5 ML: 5 INJECTION, SOLUTION EPIDURAL; INTRACAUDAL at 01:49

## 2018-08-16 RX ADMIN — METHYLPREDNISOLONE SODIUM SUCCINATE 125 MG: 125 INJECTION, POWDER, FOR SOLUTION INTRAMUSCULAR; INTRAVENOUS at 12:57

## 2018-08-16 RX ADMIN — GABAPENTIN 300 MG: 300 CAPSULE ORAL at 15:31

## 2018-08-16 RX ADMIN — KETOROLAC TROMETHAMINE 15 MG: 30 INJECTION, SOLUTION INTRAMUSCULAR at 00:39

## 2018-08-16 NOTE — ED PROCEDURE NOTE
PROCEDURE  Nerve Block  Date/Time: 8/16/2018 1:43 PM  Performed by: Namrata Cummings  Authorized by: Namrata Cummings     Patient location:  ED and bedside  Consent:     Consent obtained:  Verbal    Consent given by:  Patient    Risks discussed: Allergic reaction, infection, bleeding, pain and unsuccessful block  Universal protocol:     Time out called: yes      Patient identity confirmed:  Verbally with patient and arm band  Indications:     Indications:  Pain relief  Location:     Nerve block body site: Right posterior neck  Nerve type:  Peripheral  Pre-procedure details:     Skin preparation:  Alcohol    Preparation: Patient was prepped and draped in usual sterile fashion    Procedure details (see MAR for exact dosages): Block needle gauge:  25 G    Anesthetic injected:  Bupivacaine 0 5% w/o epi    Injection procedure:  Anatomic landmarks identified  Post-procedure details:     Outcome:  Pain relieved    Patient tolerance of procedure:   Tolerated well, no immediate complications         Ly Bird DO  08/16/18 1342

## 2018-08-16 NOTE — ED RE-EVALUATION NOTE
Discussed case and imaging findings with Dr Oliver Meredith, ENT  He feels that given benign clinical exam the radiographic findings are most likely secondary to effusion from an otitis  Says that mastoiditis is extremely rare in adults and in the setting of normal WBC and reassuring exam patient can be discharged home and follow up at the office in the morning        Gavin Wells MD  08/16/18 2032

## 2018-08-16 NOTE — ED RE-EVALUATION NOTE
CT orbits/temporal bones/skull base w contrast    (Results Pending)     Dr Loren Stinson has discussed the results of the CT scan temporal bones/skull base with on call ENT, Dr Kylie Bond states that mastoiditis is rare in adults and does not believe that the patient has mastoiditis  He will arrange to have his office call the patient this morning to have the patient seen by one of his colleagues this morning  I have discussed the results of the CT with the patient and his significant other  The patient had temporary relief of his discomfort after the occipital nerve block for several hours but has had a recrudescence of pain  He and his significant other are frustrated that he still has pain  I have discussed different options for pain control with the patient with both inpatient admission for intractable pain versus outpatient follow up with ENT this am  Reevaluation of the tympanic membranes demonstrated no fluid collection or erythema of either the right or left tympanic membrane  I have ordered 2 hydrocodone/acetaminophen tablets for the patient for his pain currently   I have written a prescription for 5 oxycodone tablets as an outpatient and the patient will follow up with ENT this am       Ag Beltre MD  08/16/18 0099

## 2018-08-16 NOTE — DISCHARGE INSTRUCTIONS
Earache, Ambulatory Care   GENERAL INFORMATION:   An earache may be caused by any of the following:   · Infection of the inner or outer ear     · Earwax buildup, or small objects put into your ear     · Ear injury caused by a cotton swab or by air pressure changes from a plane ride or scuba diving     · Other infections, such as tonsillitis or pharyngitis    · Jaw or dental problems such as cavities or TMJ    · Neck pain caused by problems such as arthritis in your upper spine  Seek immediate care for the following symptoms:   · Severe pain    · Itching, hearing loss, or dizziness    · Ringing or a feeling of fullness in your ears  Treatment for an earache  will depend on how severe it is  Pain medicine may help decrease your pain  Ask for more information about the medicines you are given and how to use them safely  Follow up with your healthcare provider as directed:  Write down your questions so you remember to ask them during your visits  CARE AGREEMENT:   You have the right to help plan your care  Learn about your health condition and how it may be treated  Discuss treatment options with your caregivers to decide what care you want to receive  You always have the right to refuse treatment  The above information is an  only  It is not intended as medical advice for individual conditions or treatments  Talk to your doctor, nurse or pharmacist before following any medical regimen to see if it is safe and effective for you  © 2014 [address] is for End User's use only and may not be sold, redistributed or otherwise used for commercial purposes  All illustrations and images included in CareNotes® are the copyrighted property of [employer]  or Junito Kauffman

## 2018-08-16 NOTE — ED PROCEDURE NOTE
Procedure  Nerve Block  Date/Time: 8/16/2018 1:32 AM  Performed by: CA SCOTT  Authorized by: Narinder Woodward     Patient location:  ED  Consent:     Consent obtained:  Verbal    Consent given by:  Patient    Risks discussed:  Bleeding, intravenous injection, pain, swelling, unsuccessful block and nerve damage    Alternatives discussed:  No treatment, delayed treatment and alternative treatment  Universal protocol:     Procedure explained and questions answered to patient or proxy's satisfaction: yes      Relevant documents present and verified: yes      Test results available and properly labeled: yes     Radiology Images displayed and confirmed  If images not available, report reviewed: yes      Required blood products, implants, devices, and special equipment available: yes      Site marked: yes      Time out called: yes      Patient identity confirmed:  Verbally with patient and arm band  Indications:     Indications:  Pain relief  Location:     Body area:  Head    Head nerve:  Lesser occipital    Nerve type:  Peripheral    Laterality:  Right  Pre-procedure details:     Skin preparation:  Alcohol    Preparation: Patient was prepped and draped in usual sterile fashion    Procedure details (see MAR for exact dosages): Block needle gauge:  27 G    Anesthetic injected:  Bupivacaine 0 5% w/o epi    Steroid injected:  None    Additive injected:  None    Injection procedure:  Anatomic landmarks palpated, anatomic landmarks identified, incremental injection, introduced needle and negative aspiration for blood  Post-procedure details:     Dressing:  None    Outcome:  Pain relieved    Patient tolerance of procedure:   Tolerated well, no immediate complications  Comments:      5 cc injected, pain completely resolved after 15 minutes                           Deniz Scott MD  08/16/18 0611

## 2018-08-16 NOTE — ED ATTENDING ATTESTATION
Bobbi Li MD, saw and evaluated the patient  I have discussed the patient with the resident/non-physician practitioner and agree with the resident's/non-physician practitioner's findings, Plan of Care, and MDM as documented in the resident's/non-physician practitioner's note, except where noted  All available labs and Radiology studies were reviewed  At this point I agree with the current assessment done in the Emergency Department  I have conducted an independent evaluation of this patient a history and physical is as follows:    58-year-old male presents with right postauricular pain  Patient had recent evaluation for cough, shortness of breath and myalgias with negative workup at that time  The symptoms have improved but yesterday the patient developed right postauricular pain which is described as intermittent and electric shock-like sensation  Patient says the pain is severe and he appears to be in significant distress  Denies trauma  Denies ear pain or hearing changes  Denies neck pain or stiffness  Patient does complain of subjective fever at home and states that earlier today he was having chills and sweats  No other complaints at this time  Evaluation the patient is awake and alert, is in distress  No postauricular swelling or tenderness  TMs clear  No jaw pain or trismus  No tenderness over the temporal arteries  Head atraumatic normocephalic  Pupils equal and reactive bilaterally  Neck supple with normal range of motion  No meningismus  Heart regular rate rhythm, no murmurs rubs or gallops  Lungs clear to auscultation bilaterally  Abdomen soft and nondistended  Nonfocal neuro  Assessment plan:  A 58-year-old man presenting with right postauricular pain which is severe  I think this is likely a variant of occipital neuralgia, however with reported fever and possible rigors at home we will get CT scans of the head and temporal bones  Will treat pain              Critical Care Time  CritCare Time    Procedures

## 2018-08-16 NOTE — ED PROVIDER NOTES
History  Chief Complaint   Patient presents with    Neck Pain     pt was seen here and D/Cd home and returing for reoccuring neck and ear pain on his right side          Patient presented to the emergency department complaining of neck pain  Patient was evaluated in the emergency department 2 times earlier this week with laboratory data obtained as well as CT scan of the orbits and sinuses  CT scan report reads as near complete opacification of the right mastoid air cells without extension into the antrum or middle ear  No signs of bony erosion or destruction  Postcontrast imaging is unremarkable and the periauricular soft tissues are unremarkable  ENT was contacted with last Emergency Department visit and recommended management as an outpatient  Patient returns today secondary continued pain and discomfort  Patient was discharged on Augmentin  He was instructed to take outpatient medication for pain  patient was discharged from the emergency department this morning at 5:30 a m  He was evaluated by ENT who stated his pain was not related to any ENT diagnosis and suggested patient return to the emergency department secondary to uncontrolled pain and neurologic evaluation  Patient was prescribed Augmentin for which he is not taking to date as he did not fill prescription  Patient denies nausea and vomiting, he describes the pain as electric and shooting starting from his postauricular neck area that goes up the back of his head  Patient did have a nerve block performed while in the emergency department for which he states did not help his pain  Patient was taking over-the-counter medications for pain without relief  He denies having any ear pain, jaw pain or eye pain  He does have tearing of his right eye with this pain  He reports that shock pain happens every 30 seconds and last for approximately 2-3 seconds              Prior to Admission Medications   Prescriptions Last Dose Informant Patient Reported? Taking? Multiple Vitamin (MULTIVITAMIN) capsule 8/15/2018 at Unknown time  Yes Yes   Sig: Take 1 capsule by mouth daily   aspirin (ECOTRIN LOW STRENGTH) 81 mg EC tablet 8/15/2018 at Unknown time Self Yes Yes   Sig: Take 81 mg by mouth daily   esomeprazole (NexIUM) 20 mg capsule 8/15/2018 at Unknown time  Yes Yes   Sig: Take 20 mg by mouth 2 (two) times a day before meals   oxyCODONE (ROXICODONE) 5 mg immediate release tablet   No No   Sig: Take 1 tablet (5 mg total) by mouth every 4 (four) hours as needed for moderate pain for up to 10 days Max Daily Amount: 30 mg   oxyCODONE-acetaminophen (PERCOCET) 5-325 mg per tablet   No No   Sig: Take 1 tablet by mouth every 4 (four) hours as needed for moderate pain for up to 10 days Max Daily Amount: 6 tablets      Facility-Administered Medications: None       Past Medical History:   Diagnosis Date    GERD (gastroesophageal reflux disease)        Past Surgical History:   Procedure Laterality Date    SC EDG US EXAM SURGICAL ALTER STOM DUODENUM/JEJUNUM N/A 10/23/2017    Procedure: RADIAL ENDOSCOPIC U/S;  Surgeon: Randy Pires MD;  Location: [facility]; Service: Gastroenterology       History reviewed  No pertinent family history  I have reviewed and agree with the history as documented  Social History   Substance Use Topics    Smoking status: Never Smoker    Smokeless tobacco: Never Used    Alcohol use Yes      Comment: social        Review of Systems   Constitutional: Negative for chills, fatigue and fever  HENT: Negative for sinus pressure and sore throat  Eyes: Positive for photophobia  Negative for visual disturbance  Respiratory: Negative for cough and shortness of breath  Cardiovascular: Negative for chest pain, palpitations and leg swelling  Gastrointestinal: Negative for abdominal pain, constipation, diarrhea, nausea and vomiting  Genitourinary: Negative for dysuria  Neurological: Positive for headaches   Negative for dizziness and light-headedness  Psychiatric/Behavioral: Negative for agitation and confusion  All other systems reviewed and are negative  Physical Exam  Physical Exam   Constitutional: He is oriented to person, place, and time  He appears well-developed and well-nourished  He appears distressed  HENT:   Head: Normocephalic and atraumatic  Right Ear: External ear normal    Left Ear: External ear normal    Nose: Nose normal    Mouth/Throat: Oropharynx is clear and moist    Eyes: Conjunctivae and EOM are normal  Pupils are equal, round, and reactive to light  Neck: Normal range of motion  Neck supple  No tracheal deviation present  Pain is not reproducible with range of motion or palpation, no significant muscle spasm apparent no adenopathy   Cardiovascular: Normal rate and regular rhythm  No murmur heard  Pulmonary/Chest: Effort normal and breath sounds normal  No respiratory distress  He has no rales  Abdominal: Soft  Bowel sounds are normal  There is no rebound and no guarding  Musculoskeletal: Normal range of motion  He exhibits no deformity  Identified trigger points postauricular neck area, with compression appears to  Alleviate pain   Neurological: He is alert and oriented to person, place, and time  He has normal strength  No cranial nerve deficit  Moves all extremities, follows commands without difficulty, strength appears to be intact x4 extremities, cranial nerves symmetric bilateral, sensory appears to be intact, speech is fluent   Skin: Skin is warm and dry  No rash noted  No erythema  Psychiatric: He has a normal mood and affect  Nursing note and vitals reviewed        Vital Signs  ED Triage Vitals [08/16/18 1206]   Temperature Pulse Respirations Blood Pressure SpO2   100 2 °F (37 9 °C) 82 18 149/79 99 %      Temp Source Heart Rate Source Patient Position - Orthostatic VS BP Location FiO2 (%)   Tympanic Monitor Sitting Left arm --      Pain Score       5           Vitals:    08/16/18 1206 08/16/18 1309 08/16/18 1415   BP: 149/79 117/75 126/72   Pulse: 82 84 78   Patient Position - Orthostatic VS: Sitting Lying Lying       Visual Acuity      ED Medications  Medications   methylPREDNISolone sodium succinate (Solu-MEDROL) injection 125 mg (125 mg Intravenous Given 8/16/18 1257)   diphenhydrAMINE (BENADRYL) injection 25 mg (25 mg Intravenous Given 8/16/18 1254)   metoclopramide (REGLAN) injection 10 mg (10 mg Intravenous Given 8/16/18 1304)   ketorolac (TORADOL) injection 30 mg (30 mg Intravenous Given 8/16/18 1255)   magnesium sulfate 2 g/50 mL IVPB (premix) 2 g (0 g Intravenous Stopped 8/16/18 1414)   sodium chloride 0 9 % bolus 1,000 mL (0 mL Intravenous Stopped 8/16/18 1415)   bupivacaine (PF) (MARCAINE) 0 5 % injection 5 mL ( Infiltration Given by Other 8/16/18 1322)   gabapentin (NEURONTIN) capsule 300 mg (300 mg Oral Given 8/16/18 1531)       Diagnostic Studies  Results Reviewed     None                 No orders to display              Procedures  Procedures       Phone Contacts  ED Phone Contact    ED Course  ED Course as of Aug 18 1135   Thu Aug 16, 2018   1340   Patient's pain appears to be better controlled  Patient injected with 3 cc 0 5% Marcaine into posterior neck trigger point  1430  Discussed with Neurology, patient to follow up as an outpatient and contact information given  They are agreement with gabapentin and will manage patient's gabapentin as an outpatient  Discussed with Darin NIX  Number of Diagnoses or Management Options  Diagnosis management comments:   Patient does not appear to have ENT etiology to pain, possible neurologic component as patient is describing shocking type pain consistent with nerve injury, inflammation or impingement  Plan to administer IV fluids as well as migraine cocktail and steroids 125 mg of Solu-Medrol  Consider initiation of gabapentin    Patient location of pain is not consistent with trigeminal neuralgia   But description of pain is consistent with a neuralgia  CritCare Time    Disposition  Final diagnoses:   Occipital neuralgia of right side     Time reflects when diagnosis was documented in both MDM as applicable and the Disposition within this note     Time User Action Codes Description Comment    8/16/2018  2:03 PM Jarred Kee Add [M54 81] Occipital neuralgia of right side       ED Disposition     ED Disposition Condition Comment    Discharge  [facility] discharge to home/self care  Condition at discharge: Good        Follow-up Information     Follow up With Specialties Details Why Contact Info    Beatrice Salas MD Neurology Call  follow-up for a septal neuralgia [address] [place] [address] [address] [facility]  [phone]            Discharge Medication List as of 8/16/2018  2:37 PM      START taking these medications    Details   gabapentin (NEURONTIN) 300 mg capsule Take 1 capsule (300 mg total) by mouth 3 (three) times a day For post-herpetic neuralgia:  Take 1 tablet on day 1,  Then take 2 tablets on day 2, Then take 3 tablets on day 3 and every day after that as instructed by your doctor , Starting Thu 8/16/2018,  Print         CONTINUE these medications which have NOT CHANGED    Details   aspirin (ECOTRIN LOW STRENGTH) 81 mg EC tablet Take 81 mg by mouth daily, Historical Med      esomeprazole (NexIUM) 20 mg capsule Take 20 mg by mouth 2 (two) times a day before meals, Historical Med      Multiple Vitamin (MULTIVITAMIN) capsule Take 1 capsule by mouth daily, Historical Med      oxyCODONE (ROXICODONE) 5 mg immediate release tablet Take 1 tablet (5 mg total) by mouth every 4 (four) hours as needed for moderate pain for up to 10 days Max Daily Amount: 30 mg, Starting Thu 8/16/2018, Until Sun 8/26/2018, Print      oxyCODONE-acetaminophen (PERCOCET) 5-325 mg per tablet Take 1 tablet by mouth every 4 (four) hours as needed for moderate pain for up to 10 days Max Daily Amount: 6 tablets, Starting u 8/16/2018, Until Sun 8/26/2018, Print           No discharge procedures on file      ED Provider  Electronically Signed by           Xavier Crooks, DO  08/16/18 [address] [place] [address], DO  08/16/18 [address] [place] [address], DO  08/18/18 [address] [place] [address], DO  08/18/18 1139

## 2018-08-16 NOTE — DISCHARGE INSTRUCTIONS
Neuralgia   WHAT YOU NEED TO KNOW:   What is trigeminal neuralgia? Trigeminal neuralgia (TN) is a problem with your trigeminal nerve that causes severe facial pain  You have a trigeminal nerve on each side of your face  The nerves allow you to feel pain, touch, and temperature changes in different areas of your face  What causes trigeminal neuralgia? The exact cause of your TN may not be known  The following can cause the nerve to send pain messages to your brain:  · Pressure from blood vessels in the head    · Pressure from a tumor or cyst    · Injury to the nerve from head trauma, surgery, or a stroke    · Damage from other diseases, such as multiple sclerosis (MS)  What are the signs and symptoms of trigeminal neuralgia? TN causes sudden, sharp, or burning pain  It normally occurs on one side of your face but can occur on both sides  · Pain attacks that last from 1 second up to 2 minutes and repeat every few minutes to hours    · Pain attacks that get worse over time    · Pain that is so severe you cannot eat, drink, or speak    · Spasms in your facial muscles during your pain attack    · Days to years without attacks  What can trigger a pain attack? Most TN pain attacks are brought on by touching a trigger area on your face:  · Eating or drinking    · Smiling, yawning, or talking    · Shaving or washing your face    · Putting on makeup or combing your hair    · Wind or temperature changes    · Noise or lights  How is trigeminal neuralgia diagnosed? Your healthcare provider will ask about your symptoms and when they started  Tell him if you have any close family members with TN  Your healthcare provider will look at your head, neck, mouth, jaws, and teeth  You may also need any of the following tests:  · CT scan: This is also called a CAT scan  An x-ray machine uses a computer to take pictures of your head  It may be used to look at bones, muscles, and blood vessels   You may be given dye through an IV to help your healthcare provider see the pictures better  Tell the healthcare provider if you are allergic to iodine or seafood  You may also be allergic to the dye  · MRI:  Pictures are taken of your head so healthcare providers can examine your trigeminal nerve  You will need to lie still during an MRI  Never enter the MRI room with any metal objects  This can cause serious injury  · MRA:  MRA is a test used to look at the blood vessels in your brain  This test may show if a blood vessel is pressing on your trigeminal nerve  An MRA may be done with an MRI to help healthcare providers better understand your TN  How is trigeminal neuralgia treated? Your TN may go away on its own without treatment  If your TN is caused by another condition, your healthcare provider will also treat that condition  · Medicines:      ¨ Anticonvulsants: These control seizures, help prevent pain attacks, and decrease symptoms  ¨ Antidepressants: These decrease pain and help prevent depression  ¨ Muscle relaxers:  When your facial muscles are relaxed, you may be less likely to have pain attacks  ¨ Pain medicines: You may be given pain medicines if your facial pain is severe  · Procedures: You may need a procedure or surgery to treat your TN if it does not get better with medicines  A procedure or surgery may also be needed if you cannot take the medicines used to treat TN  ¨ Nerve block: This is an injection of medicine that makes you lose feeling in an area of your body  You may need a nerve block if your pain is not going away, or is getting worse  A nerve block may also be used to make you lose feeling in an area before a procedure is done  ¨ Microvascular decompression: This is surgery to separate your trigeminal nerve from the blood vessel pressing on it  ¨ Percutaneous procedures:   These procedures help control your pain by destroying an area deep in your skull where nerve branches come together  Healthcare providers will insert a needle and tube through the base of your skull to reach this area  ¨ Peripheral techniques:  Peripheral techniques are done to block the nerve impulses that cause your pain attacks  Your healthcare provider may destroy the nerve with alcohol (medicine) injections, or he may freeze the nerve  He may also use surgery to remove part of the nerve  ¨ Stereotactic radiosurgery: This is also called Gamma Knife surgery  Radiation beams are used to remove a blood vessel that is pressing on the nerve  You may have some pain relief right away after radiosurgery  It may take at least 1 month before you have decreased pain  What are the risks of trigeminal neuralgia? · Medicines used to treat your TN can cause organ damage  Medicines may not help your TN  Over time, some medicines may stop working  Procedures and surgeries to treat TN can cause facial pain or damage  You may have changes in your vision, hearing, memory, speech, or sense of smell or taste  You may have trouble chewing or get mouth sores  You may get an infection, or have brain and nerve damage, seizures, a stroke, or even death  After treatment with a procedure or surgery, your TN may come back, or you may have new TN pain  · If your TN is not treated, your pain attacks may get worse and occur more often  If a growth or other medical problem is causing your TN pain, you may not get proper treatment  You may begin to have a dull, constant ache in areas of your face  Your TN pain, and fear of an attack, may be so bad that you stop brushing your teeth, eating, and drinking  This may lead to dental problems, poor nutrition, weight loss, and dehydration  You may feel tired, anxious, or depressed  How can I help manage my trigeminal neuralgia? · Keep your medicines nearby:  Even if you have not had TN symptoms for a long time, keep your medicine nearby   If your symptoms return, contact your healthcare provider before you start taking your medicines again  · Take your medicines as directed:  Do not stop taking your medicines without talking with your healthcare provider first  Indra Graves may have a bad reaction if you stop suddenly  Where can I find more information? · Trigeminal Neural[employer] Association  Rob [address] , [place]er[place] Hernandez  Phone: 7- [phone] [phone]  Web Address: Virlinda Amour  org  When should I contact my healthcare provider? · The medicines you are taking are not decreasing your TN pain  · You feel worried or depressed and find it hard to do your daily activities  · You have headaches, mouth sores, an upset stomach, or diarrhea  · Your TN pain feels worse, different, or moves to another area of your face  When should I seek immediate care or call 911? · You have a fever, stiff neck, or develop a rash or peeling skin  · You have clear or yellow fluid leaking from your procedure or surgery site  · You are feeling so depressed you want to harm yourself  · You are confused and cannot think clearly  · You are not eating or drinking, and you are losing weight  · You have eye pain, eye numbness, or sudden vision or hearing changes  · You have sudden dizziness, or problems with movement, weakness, or numbness in your face  CARE AGREEMENT:   You have the right to help plan your care  Learn about your health condition and how it may be treated  Discuss treatment options with your caregivers to decide what care you want to receive  You always have the right to refuse treatment  The above information is an  only  It is not intended as medical advice for individual conditions or treatments  Talk to your doctor, nurse or pharmacist before following any medical regimen to see if it is safe and effective for you    © 2017 [employer] Information is for End User's use only and may not be sold, redistributed or otherwise used for commercial purposes  All illustrations and images included in CareNotes® are the copyrighted property of A D A M , Inc  or Junito Kauffman

## 2018-08-16 NOTE — ED PROVIDER NOTES
History  Chief Complaint   Patient presents with    Headache     Pt c/o apoorva behind his right ear for 3 days     47year old otherwise healthy male presents for right post-auricular pain  Patient was just seen in the ED 2 days ago for complaints of myalgias, cough, and shortness of breath with benign and had negative cardiopulmonary workup at time  Patient says that these symptoms have been improving but he developed right post-auricular pain yesterday that has been constant and worsening since onset  Describes pain as sharp and feels like "lightening bolts" shooting behind his ear  Pain is non-radiating, 10/10 severity, minimal relief with ibuprofen  He has had temperature of 100F at home after taking ibuprofen  Also reports chills and night sweats and decreased appetite  He has not had any temple pain, jaw pain, visual symptoms, focal neurological symptoms, neck pain/stiffness, dental pain, chest pain, leg pain/swelling, nausea, vomiting, photophobia, or any additional symptoms or complaints  Denies recent dental infection  Prior to Admission Medications   Prescriptions Last Dose Informant Patient Reported? Taking? Multiple Vitamin (MULTIVITAMIN) capsule   Yes Yes   Sig: Take 1 capsule by mouth daily   amoxicillin-clavulanate (AUGMENTIN) 875-125 mg per tablet  Self Yes No   Sig: Take 1 tablet by mouth every 12 (twelve) hours   aspirin (ECOTRIN LOW STRENGTH) 81 mg EC tablet  Self Yes Yes   Sig: Take 81 mg by mouth daily   esomeprazole (NexIUM) 20 mg capsule   Yes Yes   Sig: Take 20 mg by mouth 2 (two) times a day before meals      Facility-Administered Medications: None       Past Medical History:   Diagnosis Date    GERD (gastroesophageal reflux disease)        Past Surgical History:   Procedure Laterality Date    OR EDG US EXAM SURGICAL ALTER STOM DUODENUM/JEJUNUM N/A 10/23/2017    Procedure: RADIAL ENDOSCOPIC U/S;  Surgeon: Yamilka Gonzalez MD;  Location: [facility];   Service: Gastroenterology History reviewed  No pertinent family history  I have reviewed and agree with the history as documented  Social History   Substance Use Topics    Smoking status: Never Smoker    Smokeless tobacco: Never Used    Alcohol use Yes      Comment: social        Review of Systems   Constitutional: Positive for chills and fever  HENT: Negative  Negative for congestion and rhinorrhea  Right post-auricular pain   Eyes: Negative  Respiratory: Positive for cough  Negative for shortness of breath  Cardiovascular: Negative  Negative for chest pain and leg swelling  Gastrointestinal: Negative  Negative for abdominal distention, abdominal pain, diarrhea, nausea and vomiting  Musculoskeletal: Negative  Negative for back pain, neck pain and neck stiffness  Skin: Negative  Negative for rash  Neurological: Negative  Negative for dizziness, syncope, speech difficulty, weakness, light-headedness and numbness  Hematological: Negative  All other systems reviewed and are negative  Physical Exam  ED Triage Vitals [08/15/18 2153]   Temperature Pulse Respirations Blood Pressure SpO2   99 7 °F (37 6 °C) 103 18 148/96 95 %      Temp Source Heart Rate Source Patient Position - Orthostatic VS BP Location FiO2 (%)   Oral Monitor Sitting Right arm --      Pain Score       Worst Possible Pain           Orthostatic Vital Signs  Vitals:    08/16/18 0015 08/16/18 0045 08/16/18 0145 08/16/18 0438   BP: 116/62 111/64 132/77 132/73   Pulse: 68 64 68 76   Patient Position - Orthostatic VS:           Physical Exam   Constitutional: He is oriented to person, place, and time  He appears well-developed and well-nourished  No distress  HENT:   Head: Normocephalic and atraumatic  Right Ear: Tympanic membrane normal    Left Ear: Tympanic membrane normal    Mouth/Throat: Oropharynx is clear and moist    Right post-auricular tenderness without erythema or edema   Right external auditory canal mildly erythematous  TMs clear  Eyes: EOM are normal    Neck: Normal range of motion  Neck supple  Cardiovascular: Normal rate, regular rhythm, normal heart sounds and intact distal pulses  Exam reveals no gallop and no friction rub  No murmur heard  Pulmonary/Chest: Effort normal and breath sounds normal  No respiratory distress  He has no wheezes  He has no rales  Abdominal: Soft  There is no tenderness  There is no rebound and no guarding  Musculoskeletal: He exhibits no edema or tenderness  Neurological: He is alert and oriented to person, place, and time  Clear fluent speech   Skin: Skin is warm and dry  Capillary refill takes less than 2 seconds  Psychiatric: He has a normal mood and affect  Nursing note and vitals reviewed        ED Medications  Medications   morphine (PF) 4 mg/mL injection 4 mg (4 mg Intravenous Given 8/15/18 2221)   sodium chloride 0 9 % bolus 1,000 mL (0 mL Intravenous Stopped 8/16/18 0041)   morphine (PF) 4 mg/mL injection 4 mg (4 mg Intravenous Given 8/15/18 2315)   ketorolac (TORADOL) injection 15 mg (15 mg Intramuscular Given 8/16/18 0039)   morphine injection 2 mg (2 mg Intravenous Given 8/16/18 0055)   iohexol (OMNIPAQUE) 350 MG/ML injection (MULTI-DOSE) 100 mL (100 mL Intravenous Given 8/16/18 0134)   bupivacaine (PF) (MARCAINE) 0 5 % injection 5 mL (5 mL Infiltration Given 8/16/18 0149)   morphine (PF) 4 mg/mL injection 4 mg (4 mg Intravenous Given 8/16/18 0437)   HYDROcodone-acetaminophen (NORCO) 5-325 mg per tablet 2 tablet (2 tablets Oral Given 8/16/18 0536)       Diagnostic Studies  Results Reviewed     Procedure Component Value Units Date/Time    Comprehensive metabolic panel [[id number]] Collected:  08/15/18 2209    Lab Status:  Final result Specimen:  Blood from Arm, Left Updated:  08/16/18 0009     Sodium 137 mmol/L      Potassium 3 6 mmol/L      Chloride 104 mmol/L      CO2 26 mmol/L      Anion Gap 7 mmol/L      BUN 13 mg/dL      Creatinine 0 96 mg/dL Glucose 114 mg/dL      Calcium 8 5 mg/dL      AST 23 U/L      ALT 46 U/L      Alkaline Phosphatase 77 U/L      Total Protein 7 7 g/dL      Albumin 3 7 g/dL      Total Bilirubin 0 45 mg/dL      eGFR 90 ml/min/1 73sq m     Narrative:         National Kidney Disease Education Program recommendations are as follows:  GFR calculation is accurate only with a steady state creatinine  Chronic Kidney disease less than 60 ml/min/1 73 sq  meters  Kidney failure less than 15 ml/min/1 73 sq  meters  CBC and differential [[id number]]  (Abnormal) Collected:  08/15/18 2209    Lab Status:  Final result Specimen:  Blood from Arm, Left Updated:  08/15/18 2352     WBC 8 31 Thousand/uL      RBC 4 91 Million/uL      Hemoglobin 15 5 g/dL      Hematocrit 43 9 %      MCV 89 fL      MCH 31 6 pg      MCHC 35 3 g/dL      RDW 12 6 %      MPV 10 6 fL      Platelets 804 Thousands/uL      nRBC 0 /100 WBCs      Neutrophils Relative 60 %      Immat GRANS % 1 %      Lymphocytes Relative 24 %      Monocytes Relative 13 (H) %      Eosinophils Relative 2 %      Basophils Relative 0 %      Neutrophils Absolute 5 01 Thousands/µL      Immature Grans Absolute 0 06 Thousand/uL      Lymphocytes Absolute 1 97 Thousands/µL      Monocytes Absolute 1 07 Thousand/µL      Eosinophils Absolute 0 17 Thousand/µL      Basophils Absolute 0 03 Thousands/µL                  CT orbits/temporal bones/skull base w contrast   Final Result by Mitch Patel DO (08/16 6735)      Near complete opacification of the right mastoid air cells without extension into the antrum or middle ear  No signs of bony erosion or destruction  Postcontrast imaging is unremarkable and the periauricular soft tissues are unremarkable        Findings are consistent with the preliminary report from [facility] which was provided shortly after completion of the exam                   Workstation performed: [id number]               Procedures  Procedures      Phone Consults  ED Phone Contact    ED Course  ED Course as of Aug 16 2031   Thu Aug 16, 2018   0034 CT orbits/temporal bones/skull base w contrast   2528 CT orbits/temporal bones/skull base w contrast   0400 Radiology report reveals right otomastoiditis  Paging ENT for management recommendations  0500: Discussed with Dr Oliver Meredith, ENT  He feels that given benign exam and reassuring workup patient is stable for outpatient management  He has very low suspicion for mastoiditis given that it is extremely rare in adults and there would be more significant physical exam findings  MDM  Number of Diagnoses or Management Options  Posterior auricular pain:   Diagnosis management comments: 47year old otherwise healthy male presents for right post-auricular pain  Within the differential consider occipital neuralgia, abscess, mastoiditis, cluster headache  Will obtain CT imaging and labs to assess and give morphine and fluids and perform occipital nerve block  Final assessment: Patient experienced moderate relief of pain following occipital nerve block  Workup is reassuring  Although CT revealed findings suggestive of mastoiditis, ENT does not feel this is likely given reassuring clinical findings and normal labs and vitals  Provided referral to ENT who agrees that patient will be able to be seen at outpatient clinic this morning  Patient expresses an understanding and agreement with the plan and remains in good condition for discharge         CritCare Time    Disposition  Final diagnoses:   Posterior auricular pain right   Posterior auricular pain of right ear     Time reflects when diagnosis was documented in both MDM as applicable and the Disposition within this note     Time User Action Codes Description Comment    8/16/2018  4:57 AM Rekha Wells Add [H92 09] Posterior auricular pain     8/16/2018  8:31 PM Rekha Wells Modify [H92 09] Posterior auricular pain right     8/16/2018  8:31 PM Rekha Wells Add [H92 01] Posterior auricular pain of right ear       ED Disposition     ED Disposition Condition Comment    Discharge  [facility]LL [facility] discharge to home/self care  Condition at discharge: Good        Follow-up Information     Follow up With Specialties Details Why Contact Info Additional [address] Ent Otolaryngology Go in 1 day  [address], DO Family Medicine Call in 1 day To make an appointment [address]  [address] Emergency Department Emergency Medicine Go to If symptoms worsen [address]  [phone]  ED, [address], [place], [address], [address]          Discharge Medication List as of 8/16/2018  5:01 AM      CONTINUE these medications which have CHANGED    Details   amoxicillin-clavulanate (AUGMENTIN) 875-125 mg per tablet Take 1 tablet by mouth every 12 (twelve) hours for 7 days, Starting Thu 8/16/2018, Until Thu 8/23/2018, Print         CONTINUE these medications which have NOT CHANGED    Details   aspirin (ECOTRIN LOW STRENGTH) 81 mg EC tablet Take 81 mg by mouth daily, Historical Med      esomeprazole (NexIUM) 20 mg capsule Take 20 mg by mouth 2 (two) times a day before meals, Historical Med      Multiple Vitamin (MULTIVITAMIN) capsule Take 1 capsule by mouth daily, Historical Med           No discharge procedures on file  ED Provider  Attending physically available and evaluated Franky Lancaster  I managed the patient along with the ED Attending      Electronically Signed by         Sae Paitño MD  08/16/18 2029       Stone Wells MD  08/16/18 2032

## 2018-08-20 ENCOUNTER — APPOINTMENT (OUTPATIENT)
Dept: LAB | Facility: HOSPITAL | Age: 53
End: 2018-08-20
Attending: PSYCHIATRY & NEUROLOGY
Payer: COMMERCIAL

## 2018-08-20 ENCOUNTER — OFFICE VISIT (OUTPATIENT)
Dept: NEUROLOGY | Facility: CLINIC | Age: 53
End: 2018-08-20
Payer: COMMERCIAL

## 2018-08-20 ENCOUNTER — HOSPITAL ENCOUNTER (OUTPATIENT)
Dept: RADIOLOGY | Facility: HOSPITAL | Age: 53
Discharge: HOME | End: 2018-08-20
Attending: PSYCHIATRY & NEUROLOGY
Payer: COMMERCIAL

## 2018-08-20 VITALS
DIASTOLIC BLOOD PRESSURE: 72 MMHG | WEIGHT: 238.4 LBS | TEMPERATURE: 98.7 F | SYSTOLIC BLOOD PRESSURE: 125 MMHG | BODY MASS INDEX: 33.38 KG/M2 | HEIGHT: 71 IN | HEART RATE: 92 BPM

## 2018-08-20 DIAGNOSIS — G44.52 NEW DAILY PERSISTENT HEADACHE: Primary | ICD-10-CM

## 2018-08-20 DIAGNOSIS — M54.2 CERVICALGIA: ICD-10-CM

## 2018-08-20 DIAGNOSIS — M54.81 OCCIPITAL NEURALGIA OF RIGHT SIDE: ICD-10-CM

## 2018-08-20 DIAGNOSIS — R51.9 WORSENING HEADACHES: ICD-10-CM

## 2018-08-20 DIAGNOSIS — H92.01 POSTERIOR AURICULAR PAIN OF RIGHT EAR: ICD-10-CM

## 2018-08-20 DIAGNOSIS — R51.9 WORSENING HEADACHES: Primary | ICD-10-CM

## 2018-08-20 DIAGNOSIS — R53.83 OTHER FATIGUE: ICD-10-CM

## 2018-08-20 LAB
BUN SERPL-MCNC: 20 MG/DL (ref 5–25)
CREAT SERPL-MCNC: 1.14 MG/DL (ref 0.6–1.3)
CRP SERPL QL: <3 MG/L
ERYTHROCYTE [SEDIMENTATION RATE] IN BLOOD: 5 MM/HOUR (ref 0–10)
GFR SERPL CREATININE-BSD FRML MDRD: 73 ML/MIN/1.73SQ M

## 2018-08-20 PROCEDURE — A9585 GADOBUTROL INJECTION: HCPCS | Performed by: PSYCHIATRY & NEUROLOGY

## 2018-08-20 PROCEDURE — 85652 RBC SED RATE AUTOMATED: CPT

## 2018-08-20 PROCEDURE — 70553 MRI BRAIN STEM W/O & W/DYE: CPT

## 2018-08-20 PROCEDURE — 82565 ASSAY OF CREATININE: CPT

## 2018-08-20 PROCEDURE — 36415 COLL VENOUS BLD VENIPUNCTURE: CPT

## 2018-08-20 PROCEDURE — 84520 ASSAY OF UREA NITROGEN: CPT

## 2018-08-20 PROCEDURE — 86140 C-REACTIVE PROTEIN: CPT

## 2018-08-20 PROCEDURE — 99245 OFF/OP CONSLTJ NEW/EST HI 55: CPT | Performed by: PSYCHIATRY & NEUROLOGY

## 2018-08-20 RX ORDER — OMEPRAZOLE 20 MG/1
TABLET, DELAYED RELEASE ORAL
COMMUNITY
Start: 2017-10-03 | End: 2020-07-09 | Stop reason: ALTCHOICE

## 2018-08-20 RX ORDER — MAGNESIUM 200 MG
TABLET ORAL
COMMUNITY
Start: 2017-10-03 | End: 2022-07-15 | Stop reason: CLARIF

## 2018-08-20 RX ORDER — GABAPENTIN 300 MG/1
CAPSULE ORAL
Qty: 90 CAPSULE | Refills: 0 | Status: SHIPPED | OUTPATIENT
Start: 2018-08-20 | End: 2018-08-20 | Stop reason: SDUPTHER

## 2018-08-20 RX ORDER — GABAPENTIN 300 MG/1
CAPSULE ORAL
Qty: 90 CAPSULE | Refills: 0 | Status: SHIPPED | OUTPATIENT
Start: 2018-08-20 | End: 2020-07-09 | Stop reason: ALTCHOICE

## 2018-08-20 RX ORDER — NICOTINE POLACRILEX 2 MG
GUM BUCCAL
COMMUNITY
Start: 2017-10-03

## 2018-08-20 RX ADMIN — GADOBUTROL 10 ML: 604.72 INJECTION INTRAVENOUS at 21:27

## 2018-08-20 NOTE — PROGRESS NOTES
Patient ID: Edin Wiley is a 47 y o  male  Assessment/Plan:   Problem List Items Addressed This Visit        Other    Cervicalgia    Other fatigue    Posterior auricular pain of right ear    Relevant Orders    MRI brain with and without contrast    Occipital neuralgia of right side    Relevant Medications    gabapentin (NEURONTIN) 300 mg capsule    Other Relevant Orders    C-reactive protein    Sedimentation rate, automated    BUN    Creatinine, serum    Worsening headaches - Primary    Relevant Orders    C-reactive protein    MRI brain with and without contrast         Mr Milton has presented for Neurologic evaluation in Emergent matter for headache and fever  Patient  had total 3 visits to [facility] ER for severe pain in right retroauricular area with radiographic signs of right-sided  Mastoiditis  Patient had no antibacterial therapy started with lower grade fever reported for several days  Patient described severe pain had subsided after 2 nerve blockers and multiple opioid treatments  Concern for acute mastoiditis vs aseptic meningitis - stat MRI brain w/wo will be provided to help with this emergent case  No focal weakness or numbness has been reported  Subjective: severe excruciating head pain   Patient complains of neck pain shooting up on the side of his ears  Patient states he is always tired and appetite changed  HPI/History of Present Illness   Mr Milton presented to [place] [address] Neurology office on emergent matter for evaluation of excruciating pain in his head  Patient described not been able to move on Monday and ER evaluation was consistent with dehydration  Since that time patient does not feel well  Patient stated that Tuesday night he developed right retroauricular pain with CT scan completed on 8/16/18 - patient has described pain has progressed and made him feeling worse, with no facial weakness or slurred speech reported   Patirnt has right hearing loss for a while now   Nerve block was provided during his ER visit with some improvement noted and morphine through IV he was relieved  Shooting pain was improving; next day morning  ENT was evaluated with no diagnoses of mastoiditis has been made  Every 12 seconds severe pain lasting 1-2 seconds, 10/10 in severity; Patient had another visit to ER and along with medications and another nerve block at right occipital; area helped; steroids and gabapentin  Patient had a fever Wednesday night 100 7F, coughing and SOB has been since last weekend 8/12/18  Patient has 98 7F  The following portions of the patient's history were reviewed and updated as appropriate:   He  has a past medical history of GERD (gastroesophageal reflux disease)  He   Patient Active Problem List    Diagnosis Date Noted    Cervicalgia 08/20/2018    Other fatigue 08/20/2018    Posterior auricular pain of right ear 08/20/2018    Occipital neuralgia of right side 08/20/2018    Worsening headaches 08/20/2018     He  has a past surgical history that includes pr edg us exam surgical alter stom duodenum/jejunum (N/A, 10/23/2017)  His family history is not on file  He  reports that he has never smoked  He has never used smokeless tobacco  He reports that he drinks alcohol  He reports that he does not use drugs    Current Outpatient Prescriptions   Medication Sig Dispense Refill    aspirin (ECOTRIN LOW STRENGTH) 81 mg EC tablet Take 81 mg by mouth daily      Biotin 1 MG CAPS Take by mouth      cholecalciferol (VITAMIN D3) 1,000 units tablet Take 1 capsule by mouth once a week      esomeprazole (NexIUM) 20 mg capsule Take 20 mg by mouth 2 (two) times a day before meals      gabapentin (NEURONTIN) 300 mg capsule 1 capsule PO tid 90 capsule 0    Magnesium 200 MG TABS Take by mouth      Melatonin 1 MG CAPS Take by mouth      Methylprednisolone 4 MG TBPK Use as directed on package 21 tablet 0    Multiple Vitamin (MULTIVITAMIN) capsule Take 1 capsule by mouth daily      omeprazole (PRILOSEC OTC) 20 MG tablet Take by mouth      Potassium 75 MG TABS Take by mouth      SUPER B COMPLEX/C PO Take 1 tablet by mouth daily       No current facility-administered medications for this visit  Current Outpatient Prescriptions on File Prior to Visit   Medication Sig    aspirin (ECOTRIN LOW STRENGTH) 81 mg EC tablet Take 81 mg by mouth daily    esomeprazole (NexIUM) 20 mg capsule Take 20 mg by mouth 2 (two) times a day before meals    Methylprednisolone 4 MG TBPK Use as directed on package    Multiple Vitamin (MULTIVITAMIN) capsule Take 1 capsule by mouth daily    [DISCONTINUED] gabapentin (NEURONTIN) 300 mg capsule Take 1 capsule (300 mg total) by mouth 3 (three) times a day For post-herpetic neuralgia: Take 1 tablet on day 1,  Then take 2 tablets on day 2, Then take 3 tablets on day 3 and every day after that as instructed by your doctor   [DISCONTINUED] oxyCODONE (ROXICODONE) 5 mg immediate release tablet Take 1 tablet (5 mg total) by mouth every 4 (four) hours as needed for moderate pain for up to 10 days Max Daily Amount: 30 mg    [DISCONTINUED] oxyCODONE-acetaminophen (PERCOCET) 5-325 mg per tablet Take 1 tablet by mouth every 4 (four) hours as needed for moderate pain for up to 10 days Max Daily Amount: 6 tablets     No current facility-administered medications on file prior to visit  He has No Known Allergies            Objective:    Blood pressure 125/72, pulse 92, temperature 98 7 °F (37 1 °C), height 5' 11" (1 803 m), weight 108 kg (238 lb 6 4 oz)  Physical Exam/Neurological Exam  CONSTITUTIONAL: NAD, pleasant  NECK: supple, no lymphadenopathy, no thyromegaly, no JVD  CARDIOVASCULAR: RRR, normal S1S2, no murmurs, no rubs  RESP: clear to auscultation bilaterally, no wheezes/rhonchi/rales  ABDOMEN: soft, non tender, non distended  SKIN: no rash or skin lesions  EXTREMITIES: no edema, pulses 2+bilaterally   PSYCH: appropriate mood and affect  NEUROLOGIC COMPREHENSIVE EXAM: Patient is oriented to person, place and time, NAD; appropriate affect  CN II, III, IV, V, VI, VII,VIII,IX,X,XI-XII intact with EOMI, PERRLA, OKN intact, VF grossly intact, fundi poorly visualized secondary to pupillary constriction; symmetric face noted  Motor: 5/5 UE/LE bilateral symmetric; Sensory: intact to light touch and pinprick bilaterally; normal vibration sensation feet bilaterally; Coordination within normal limits on FTN and OCTAVIO testing; DTR: 2/4 through, no Babinski, no clonus  Tandem gait is intact  Romberg: negative  ROS:  12 points of review of system was reviewed with the patient and was unremarkable with exception: see HPI  Review of Systems   Constitutional: Positive for appetite change and fever  HENT: Negative  Negative for hearing loss, tinnitus, trouble swallowing and voice change  Eyes: Positive for photophobia and pain  Respiratory: Positive for shortness of breath  Cardiovascular: Negative  Negative for palpitations  Gastrointestinal: Negative  Negative for nausea  Endocrine: Negative  Negative for cold intolerance and heat intolerance  Genitourinary: Negative  Negative for dysuria, frequency and urgency  Musculoskeletal: Positive for neck pain  Negative for myalgias  Skin: Negative  Allergic/Immunologic: Negative  Neurological: Positive for light-headedness  Negative for dizziness, tremors, seizures, syncope, facial asymmetry, speech difficulty, weakness and numbness  Hematological: Negative  Does not bruise/bleed easily  Psychiatric/Behavioral: Positive for sleep disturbance (Snoring)  Negative for confusion and hallucinations

## 2018-08-21 ENCOUNTER — TELEPHONE (OUTPATIENT)
Dept: NEUROLOGY | Facility: CLINIC | Age: 53
End: 2018-08-21

## 2018-08-21 DIAGNOSIS — H70.91: Primary | ICD-10-CM

## 2018-08-21 NOTE — TELEPHONE ENCOUNTER
Please help the patient with recommendations - patient requires urgent evaluation by ENT and referral is available    Could you please guide the patient who to reach ENT team and no ENT available today, patient may benefit from going back to [facility] for treatment

## 2018-08-21 NOTE — TELEPHONE ENCOUNTER
Alix from PCP's office called stating Dr Liz Lowe would like to discuss pt's visit and recent MRIs  No other info provided      PCP back line [phone]

## 2018-08-22 ENCOUNTER — APPOINTMENT (EMERGENCY)
Dept: RADIOLOGY | Facility: HOSPITAL | Age: 53
End: 2018-08-22
Payer: COMMERCIAL

## 2018-08-22 ENCOUNTER — HOSPITAL ENCOUNTER (EMERGENCY)
Facility: HOSPITAL | Age: 53
Discharge: HOME | End: 2018-08-22
Attending: EMERGENCY MEDICINE
Payer: COMMERCIAL

## 2018-08-22 VITALS
DIASTOLIC BLOOD PRESSURE: 59 MMHG | OXYGEN SATURATION: 93 % | TEMPERATURE: 97.9 F | RESPIRATION RATE: 20 BRPM | HEART RATE: 64 BPM | SYSTOLIC BLOOD PRESSURE: 105 MMHG

## 2018-08-22 DIAGNOSIS — H65.191 ACUTE EFFUSION OF RIGHT EAR: Primary | ICD-10-CM

## 2018-08-22 DIAGNOSIS — M79.10 MYALGIA: Primary | ICD-10-CM

## 2018-08-22 DIAGNOSIS — R51.9 HEADACHE: ICD-10-CM

## 2018-08-22 DIAGNOSIS — H70.90 MASTOIDITIS, UNSPECIFIED LATERALITY: Primary | ICD-10-CM

## 2018-08-22 DIAGNOSIS — R11.0 NAUSEA: ICD-10-CM

## 2018-08-22 LAB
ANION GAP SERPL CALCULATED.3IONS-SCNC: 5 MMOL/L (ref 4–13)
BASOPHILS # BLD AUTO: 0.04 THOUSANDS/ΜL (ref 0–0.1)
BASOPHILS NFR BLD AUTO: 0 % (ref 0–1)
BUN SERPL-MCNC: 15 MG/DL (ref 5–25)
CALCIUM SERPL-MCNC: 8.8 MG/DL (ref 8.3–10.1)
CHLORIDE SERPL-SCNC: 101 MMOL/L (ref 100–108)
CO2 SERPL-SCNC: 25 MMOL/L (ref 21–32)
CREAT SERPL-MCNC: 1.02 MG/DL (ref 0.6–1.3)
EOSINOPHIL # BLD AUTO: 0.2 THOUSAND/ΜL (ref 0–0.61)
EOSINOPHIL NFR BLD AUTO: 2 % (ref 0–6)
ERYTHROCYTE [DISTWIDTH] IN BLOOD BY AUTOMATED COUNT: 12.5 % (ref 11.6–15.1)
GFR SERPL CREATININE-BSD FRML MDRD: 84 ML/MIN/1.73SQ M
GLUCOSE SERPL-MCNC: 98 MG/DL (ref 65–140)
HCT VFR BLD AUTO: 46 % (ref 36.5–49.3)
HGB BLD-MCNC: 16 G/DL (ref 12–17)
IMM GRANULOCYTES # BLD AUTO: 0.08 THOUSAND/UL (ref 0–0.2)
IMM GRANULOCYTES NFR BLD AUTO: 1 % (ref 0–2)
LYMPHOCYTES # BLD AUTO: 0.97 THOUSANDS/ΜL (ref 0.6–4.47)
LYMPHOCYTES NFR BLD AUTO: 10 % (ref 14–44)
MCH RBC QN AUTO: 31.4 PG (ref 26.8–34.3)
MCHC RBC AUTO-ENTMCNC: 34.8 G/DL (ref 31.4–37.4)
MCV RBC AUTO: 90 FL (ref 82–98)
MONOCYTES # BLD AUTO: 1.01 THOUSAND/ΜL (ref 0.17–1.22)
MONOCYTES NFR BLD AUTO: 11 % (ref 4–12)
NEUTROPHILS # BLD AUTO: 7.28 THOUSANDS/ΜL (ref 1.85–7.62)
NEUTS SEG NFR BLD AUTO: 76 % (ref 43–75)
NRBC BLD AUTO-RTO: 0 /100 WBCS
PLATELET # BLD AUTO: 221 THOUSANDS/UL (ref 149–390)
PMV BLD AUTO: 9.8 FL (ref 8.9–12.7)
POTASSIUM SERPL-SCNC: 5.2 MMOL/L (ref 3.5–5.3)
RBC # BLD AUTO: 5.1 MILLION/UL (ref 3.88–5.62)
SODIUM SERPL-SCNC: 131 MMOL/L (ref 136–145)
WBC # BLD AUTO: 9.58 THOUSAND/UL (ref 4.31–10.16)

## 2018-08-22 PROCEDURE — 96375 TX/PRO/DX INJ NEW DRUG ADDON: CPT

## 2018-08-22 PROCEDURE — 71046 X-RAY EXAM CHEST 2 VIEWS: CPT

## 2018-08-22 PROCEDURE — 96374 THER/PROPH/DIAG INJ IV PUSH: CPT

## 2018-08-22 PROCEDURE — 99283 EMERGENCY DEPT VISIT LOW MDM: CPT

## 2018-08-22 PROCEDURE — 36415 COLL VENOUS BLD VENIPUNCTURE: CPT | Performed by: EMERGENCY MEDICINE

## 2018-08-22 PROCEDURE — 80048 BASIC METABOLIC PNL TOTAL CA: CPT | Performed by: EMERGENCY MEDICINE

## 2018-08-22 PROCEDURE — 85025 COMPLETE CBC W/AUTO DIFF WBC: CPT | Performed by: EMERGENCY MEDICINE

## 2018-08-22 RX ORDER — CIPROFLOXACIN 500 MG/1
500 TABLET, FILM COATED ORAL 2 TIMES DAILY
Qty: 14 TABLET | Refills: 0 | Status: SHIPPED | OUTPATIENT
Start: 2018-08-22 | End: 2018-08-29

## 2018-08-22 RX ORDER — ONDANSETRON 2 MG/ML
4 INJECTION INTRAMUSCULAR; INTRAVENOUS ONCE
Status: COMPLETED | OUTPATIENT
Start: 2018-08-22 | End: 2018-08-22

## 2018-08-22 RX ORDER — KETOROLAC TROMETHAMINE 30 MG/ML
15 INJECTION, SOLUTION INTRAMUSCULAR; INTRAVENOUS ONCE
Status: COMPLETED | OUTPATIENT
Start: 2018-08-22 | End: 2018-08-22

## 2018-08-22 RX ADMIN — ONDANSETRON 4 MG: 2 INJECTION, SOLUTION INTRAMUSCULAR; INTRAVENOUS at 10:34

## 2018-08-22 RX ADMIN — KETOROLAC TROMETHAMINE 15 MG: 30 INJECTION, SOLUTION INTRAMUSCULAR at 10:34

## 2018-08-22 NOTE — ED ATTENDING ATTESTATION
Bozena Wan DO, saw and evaluated the patient  I have discussed the patient with the resident/non-physician practitioner and agree with the resident's/non-physician practitioner's findings, Plan of Care, and MDM as documented in the resident's/non-physician practitioner's note, except where noted  All available labs and Radiology studies were reviewed  At this point I agree with the current assessment done in the Emergency Department  I have conducted an independent evaluation of this patient a history and physical is as follows:    48 yo male presents for evaluation of R sided mastoid pain without tenderness  Pain 4/10 has been going on for the past week  Non radiating  Denies fever  No other c/o at this time  Originally seen on 8/13/18 for URI symptoms, generalized muscle aches  Returned a few days later for sharp stabbing R sided ear pain  Had CT scan at that time that showed possible R sided mastoiditis  Saw ENT the next day on 8/16/18  Still was c/o HA at that time, was told that his CT findings weren't responsible for his HA and to go to the ED for tx of his HA  He returned to the ED and had typical tx of his HA with improvement in symptoms  He saw his neurologist and had a stat MRI of his brain which revealed R sided mastoid air cell effusion  Neurology tried to contact ENT but was unable to so pt was sent to ED  Pt has a clear mobile middle ear effusion, TM is not erythematous or bulging  R mastoid is non tender to palpation or percussion, no overlying erythema  No pain with movement of the external ear  Imp: R sided mastoid pain without tenderness, erythema  MRI findings of R mastoid effusion  Clinically pt does not have mastoiditis  Plan: d/w ENT        Critical Care Time  CritCare Time    Procedures

## 2018-08-22 NOTE — ED PROVIDER NOTES
History  Chief Complaint   Patient presents with    Evaluation of Abnormal Diagnostic Test     Seen here recently for head pain and body pain, seen at neurologist and had stat MRI, found mastoiditis  sent here for treatment d/t increased lethargy, fevers, head pain at top of head  reports nausea, dizziness     This is a 48-year-old male who returns to the ER for mastoiditis  The patient was initially seen on August 13th for diffuse myalgias and URI type symptoms  He was given 2 L of fluid and was ultimately discharged  He return to the emergency department on August 15th for a right-sided postauricular headache described as stabbing  He had a CT scan at that time which revealed right-sided mastoiditis  ENT was called and he was given a follow-up the next day  ENT felt that his worsening headache was unlikely related to his mastoiditis  So, he was sent back to the emergency department for pain management of his headache  The patient received a nerve block, migraine cocktail, and other pain meds  He was given follow-up to Neurology  He was seen by Neurology on August 20th who sent him for a stat MRI  The MRA revealed a moderate right mastoid effusion with suspected right middle ear fluid  Neurology was unable to reach ENT for evaluation  Per the RN note, the patient was advised to come to the emergency department for ENT evaluation for his worsening symptoms  Patient and wife ultimately decided come back to the emergency department for evaluation  He also complains of nausea without vomiting and a cough for the past week or so  He did receive an x-ray on August 13th which was negative  Denies fever/chills, vomiting, lightheadedness/dizziness, numbness/weakness, change in vision, URI symptoms, neck pain, chest pain, palpitations, shortness of breath, back pain, flank pain, abdominal pain, diarrhea, hematochezia, melena, dysuria, hematuria              Prior to Admission Medications   Prescriptions Last Dose Informant Patient Reported? Taking? Biotin 1 MG CAPS  Self Yes No   Sig: Take by mouth   Magnesium 200 MG TABS  Self Yes No   Sig: Take by mouth   Melatonin 1 MG CAPS  Self Yes No   Sig: Take by mouth   Methylprednisolone 4 MG TBPK  Self No No   Sig: Use as directed on package   Multiple Vitamin (MULTIVITAMIN) capsule  Self Yes No   Sig: Take 1 capsule by mouth daily   Potassium 75 MG TABS  Self Yes No   Sig: Take by mouth   SUPER B COMPLEX/C PO  Self Yes No   Sig: Take 1 tablet by mouth daily   aspirin (ECOTRIN LOW STRENGTH) 81 mg EC tablet  Self Yes No   Sig: Take 81 mg by mouth daily   cholecalciferol (VITAMIN D3) 1,000 units tablet  Self Yes No   Sig: Take 1 capsule by mouth once a week   esomeprazole (NexIUM) 20 mg capsule  Self Yes No   Sig: Take 20 mg by mouth 2 (two) times a day before meals   gabapentin (NEURONTIN) 300 mg capsule   No No   Si capsule PO tid   omeprazole (PRILOSEC OTC) 20 MG tablet  Self Yes No   Sig: Take by mouth      Facility-Administered Medications: None       Past Medical History:   Diagnosis Date    GERD (gastroesophageal reflux disease)        Past Surgical History:   Procedure Laterality Date    OH EDG US EXAM SURGICAL ALTER STOM DUODENUM/JEJUNUM N/A 10/23/2017    Procedure: RADIAL ENDOSCOPIC U/S;  Surgeon: Magnolia Beth MD;  Location: [facility]; Service: Gastroenterology       History reviewed  No pertinent family history  I have reviewed and agree with the history as documented  Social History   Substance Use Topics    Smoking status: Never Smoker    Smokeless tobacco: Never Used    Alcohol use Yes      Comment: social        Review of Systems   Constitutional: Negative for chills, fatigue and fever  HENT: Positive for ear pain  Negative for rhinorrhea, sore throat and trouble swallowing  Eyes: Negative for photophobia and visual disturbance  Respiratory: Positive for cough  Negative for chest tightness and shortness of breath      Cardiovascular: Negative for chest pain, palpitations and leg swelling  Gastrointestinal: Positive for nausea  Negative for abdominal pain, blood in stool, diarrhea and vomiting  Endocrine: Negative for polyuria  Genitourinary: Negative for dysuria, flank pain and hematuria  Musculoskeletal: Negative for back pain and neck pain  Skin: Negative for color change and rash  Allergic/Immunologic: Negative for immunocompromised state  Neurological: Positive for headaches  Negative for dizziness, weakness, light-headedness and numbness  All other systems reviewed and are negative  Physical Exam  ED Triage Vitals   Temperature Pulse Respirations Blood Pressure SpO2   08/22/18 0958 08/22/18 0954 08/22/18 0954 08/22/18 0954 08/22/18 0954   97 9 °F (36 6 °C) 73 18 124/85 95 %      Temp Source Heart Rate Source Patient Position - Orthostatic VS BP Location FiO2 (%)   08/22/18 0958 08/22/18 0954 08/22/18 0954 08/22/18 0954 --   Oral Monitor Sitting Right arm       Pain Score       08/22/18 0954       4           Orthostatic Vital Signs  Vitals:    08/22/18 0954 08/22/18 1000 08/22/18 1058 08/22/18 1115   BP: 124/85 124/85 119/60 107/61   Pulse: 73 70 68 64   Patient Position - Orthostatic VS: Sitting Lying Lying        Physical Exam   Constitutional: Vital signs are normal  He appears well-developed and well-nourished  He is cooperative  No distress  HENT:   Right Ear: Hearing, external ear and ear canal normal  No mastoid tenderness  A middle ear effusion is present  No hemotympanum  Left Ear: Hearing, tympanic membrane, external ear and ear canal normal   No middle ear effusion  Mouth/Throat: Uvula is midline and oropharynx is clear and moist    Eyes: Conjunctivae, EOM and lids are normal  Pupils are equal, round, and reactive to light  Neck: Trachea normal  No thyroid mass and no thyromegaly present  Cardiovascular: Normal rate, regular rhythm, normal heart sounds, intact distal pulses and normal pulses      No murmur heard  Pulmonary/Chest: Effort normal and breath sounds normal    Abdominal: Soft  Normal appearance and bowel sounds are normal  There is no tenderness  There is no rebound, no guarding, no CVA tenderness and negative Amado's sign  Neurological: He is alert  Skin: Skin is warm, dry and intact  Psychiatric: He has a normal mood and affect  His speech is normal and behavior is normal  Thought content normal        ED Medications  Medications   ketorolac (TORADOL) injection 15 mg (15 mg Intravenous Given 8/22/18 1034)   ondansetron (ZOFRAN) injection 4 mg (4 mg Intravenous Given 8/22/18 1034)       Diagnostic Studies  Results Reviewed     Procedure Component Value Units Date/Time    Basic metabolic panel [[id number]]  (Abnormal) Collected:  08/22/18 1034    Lab Status:  Final result Specimen:  Blood from Arm, Left Updated:  08/22/18 1103     Sodium 131 (L) mmol/L      Potassium 5 2 mmol/L      Chloride 101 mmol/L      CO2 25 mmol/L      Anion Gap 5 mmol/L      BUN 15 mg/dL      Creatinine 1 02 mg/dL      Glucose 98 mg/dL      Calcium 8 8 mg/dL      eGFR 84 ml/min/1 73sq m     Narrative:         National Kidney Disease Education Program recommendations are as follows:  GFR calculation is accurate only with a steady state creatinine  Chronic Kidney disease less than 60 ml/min/1 73 sq  meters  Kidney failure less than 15 ml/min/1 73 sq  meters      CBC and differential [[id number]]  (Abnormal) Collected:  08/22/18 1034    Lab Status:  Final result Specimen:  Blood from Arm, Left Updated:  08/22/18 1049     WBC 9 58 Thousand/uL      RBC 5 10 Million/uL      Hemoglobin 16 0 g/dL      Hematocrit 46 0 %      MCV 90 fL      MCH 31 4 pg      MCHC 34 8 g/dL      RDW 12 5 %      MPV 9 8 fL      Platelets 350 Thousands/uL      nRBC 0 /100 WBCs      Neutrophils Relative 76 (H) %      Immat GRANS % 1 %      Lymphocytes Relative 10 (L) %      Monocytes Relative 11 %      Eosinophils Relative 2 %      Basophils Relative 0 % Neutrophils Absolute 7 28 Thousands/µL      Immature Grans Absolute 0 08 Thousand/uL      Lymphocytes Absolute 0 97 Thousands/µL      Monocytes Absolute 1 01 Thousand/µL      Eosinophils Absolute 0 20 Thousand/µL      Basophils Absolute 0 04 Thousands/µL                  XR chest 2 views   ED Interpretation by Darryle Parkins, MD (08/22 1104)   No acute cardiopulmonary disease  Final Result by Srinivasa Sanabria MD (08/22 1128)      No acute cardiopulmonary disease  Workstation performed: [id number]               Procedures  Procedures      Phone Consults  ED Phone Contact    ED Course  ED Course as of Aug 22 1158   Wed Aug 22, 2018   1050 WBC: 9 58   1050 Hemoglobin: 16 0   1050 Platelets: 610   0332 Spoke with Dr Liset Bruce from ENT  States that we should start the patient on cipro and have the patient take NSAIDs  Patient is to call the office today to set up an appointment for possible tympanostomy tube  MDM  Number of Diagnoses or Management Options  Diagnosis management comments: Will check basic labs and a chest x-ray  Ultimately, I will speak with ENT  Clinically, the patient does not have mastoiditis  CritCare Time    Disposition  Final diagnoses:   Acute effusion of right ear   Headache   Nausea     Time reflects when diagnosis was documented in both MDM as applicable and the Disposition within this note     Time User Action Codes Description Comment    8/22/2018 11:53 AM Lynett Mons P Add [H65 191] Acute effusion of right ear     8/22/2018 11:53 AM Lynett Mons P Add [R51] Headache     8/22/2018 11:53 AM Lynett Mons P Add [R11 0] Nausea       ED Disposition     ED Disposition Condition Comment    Discharge  [facility] discharge to home/self care      Condition at discharge: Stable        Follow-up Information     Follow up With Specialties Details Why Contact Info Additional [address] Emergency Department Emergency Medicine Go to If symptoms worsen [address]  [phone] [facility], [address], [place], [place], [address] Angelica Calle MD Otolaryngology Go today  [address] [address]  [phone]             Patient's Medications   Discharge Prescriptions    CIPROFLOXACIN (CIPRO) 500 MG TABLET    Take 1 tablet (500 mg total) by mouth 2 (two) times a day for 7 days       Start Date: 8/22/2018 End Date: 8/29/2018       Order Dose: 500 mg       Quantity: 14 tablet    Refills: 0     No discharge procedures on file  ED Provider  Attending physically available and evaluated Phoebe Nobles I managed the patient along with the ED Attending      Electronically Signed by         Yeni Mesa MD  08/22/18 1962

## 2018-08-22 NOTE — TELEPHONE ENCOUNTER
Pt's significant other Leidy called  She states pt was in on Monday, was sent for stat MRI and bloodwork  She states that Dr Betty Sandoval, called pt and advised of the result and to follow up with pcp  She states that you mentioned prescribing pain med, but no rx was sent to pt's pharmacy  She states that pt's symptoms are getting worse, woke up this morning whole body is aching, pressure in his head, having time speaking and temp 99 3  Pt took oxycodone 5 mg 1 tab this morning, prescribed by doctor in the ER 8/16/18  Advised pt of all of the below  She verbalized clear understanding of all instructions  I gave Dr Julio Eng (ENT) contact #  I was not able to talk to anyone at Dr Julio Eng office, office still close  Hermelinda Chase wants to know "what treatment does she thinks pt should get in the ER?"  Again, I advised her that pt may benefit from going back to ER/UR for treatment if no ENT avail and d/t worsening symptoms  She verbalized clear understanding             [phone]-[facility]  [phone] pt

## 2018-08-22 NOTE — TELEPHONE ENCOUNTER
Patient was sent to Neurology on Urgent matter for right retroauricular pain, with right occipital nerve block was provided to the patient prior to his visit, with no pain described during his evaluation  MRI brain STAT was completed - patient has no signs of encephalitis or meningitis, considering his imaging completed several days after his initial complaints  No neck pain or tenderness on palpation, with no focal neurologic deficit has been noted on his exam  The only finding of his imaging was moderate mastoiditis and right middle ear fluid  Patient does complaint of non-specific diffuse myalgia with basic bood work completed at [facility]  ER course today involved ENT team evaluation, otherwise, no significant or new focal neurologic complaints has been described  Pain management for occipital neuralgia is a trial of oxcarbazepine of gabapentin 300 mg with last one sent to the patient pharmacy if right occipital pain returns

## 2018-08-22 NOTE — DISCHARGE INSTRUCTIONS
Acute Headache   WHAT YOU NEED TO KNOW:   An acute headache is pain or discomfort that starts suddenly and gets worse quickly  You may have an acute headache only when you feel stress or eat certain foods  Other acute headache pain can happen every day, and sometimes several times a day  DISCHARGE INSTRUCTIONS:   Return to the emergency department if:   · You have severe pain  · You have numbness or weakness on one side of your face or body  · You have a headache that occurs after a blow to the head, a fall, or other trauma  · You have a headache, are forgetful or confused, or have trouble speaking  · You have a headache, stiff neck, and a fever  Contact your healthcare provider if:   · You have a constant headache and are vomiting  · You have a headache each day that does not get better, even after treatment  · You have changes in your headaches, or new symptoms that occur when you have a headache  · You have questions or concerns about your condition or care  Medicines: You may need any of the following:  · Prescription pain medicine  may be given  The medicine your healthcare provider recommends will depend on the kind of headaches you have  You will need to take prescription headache medicines as directed to prevent a problem called rebound headache  These headaches happen with regular use of pain relievers for headache disorders  · NSAIDs , such as ibuprofen, help decrease swelling, pain, and fever  This medicine is available with or without a doctor's order  NSAIDs can cause stomach bleeding or kidney problems in certain people  If you take blood thinner medicine, always ask your healthcare provider if NSAIDs are safe for you  Always read the medicine label and follow directions  · Acetaminophen  decreases pain and fever  It is available without a doctor's order  Ask how much to take and how often to take it  Follow directions   Read the labels of all other medicines you are using to see if they also contain acetaminophen, or ask your doctor or pharmacist  Acetaminophen can cause liver damage if not taken correctly  Do not use more than 3 grams (3,000 milligrams) total of acetaminophen in one day  · Antidepressants  may be given for some kinds of headaches  · Take your medicine as directed  Contact your healthcare provider if you think your medicine is not helping or if you have side effects  Tell him or her if you are allergic to any medicine  Keep a list of the medicines, vitamins, and herbs you take  Include the amounts, and when and why you take them  Bring the list or the pill bottles to follow-up visits  Carry your medicine list with you in case of an emergency  Manage your symptoms:   · Apply heat or ice  on the headache area  Use a heat or ice pack  For an ice pack, you can also put crushed ice in a plastic bag  Cover the pack or bag with a towel before you apply it to your skin  Ice and heat both help decrease pain, and heat also helps decrease muscle spasms  Apply heat for 20 to 30 minutes every 2 hours  Apply ice for 15 to 20 minutes every hour  Apply heat or ice for as long and for as many days as directed  You may alternate heat and ice  · Relax your muscles  Lie down in a comfortable position and close your eyes  Relax your muscles slowly  Start at your toes and work your way up your body  · Keep a record of your headaches  Write down when your headaches start and stop  Include your symptoms and what you were doing when the headache began  Record what you ate or drank for 24 hours before the headache started  Describe the pain and where it hurts  Keep track of what you did to treat your headache and if it worked  Prevent an acute headache:   · Avoid anything that triggers an acute headache  Examples include exposure to chemicals, going to high altitude, or not getting enough sleep  Create a regular sleep routine   Go to sleep at the same time and wake up at the same time each day  Do not use electronic devices before bedtime  These may trigger a headache or prevent you from sleeping well  · Do not smoke  Nicotine and other chemicals in cigarettes and cigars can trigger an acute headache or make it worse  Ask your healthcare provider for information if you currently smoke and need help to quit  E-cigarettes or smokeless tobacco still contain nicotine  Talk to your healthcare provider before you use these products  · Limit alcohol as directed  Alcohol can trigger an acute headache or make it worse  If you have cluster headaches, do not drink alcohol during an episode  For other types of headaches, ask your healthcare provider if it is safe for you to drink alcohol  Ask how much is safe for you to drink, and how often  · Exercise as directed  Exercise can reduce tension and help with headache pain  Aim for 30 minutes of physical activity on most days of the week  Your healthcare provider can help you create an exercise plan  · Eat a variety of healthy foods  Healthy foods include fruits, vegetables, low-fat dairy products, lean meats, fish, whole grains, and cooked beans  Your healthcare provider or dietitian can help you create meals plans if you need to avoid foods that trigger headaches  Follow up with your healthcare provider as directed:  Bring your headache record with you when you see your healthcare provider  Write down your questions so you remember to ask them during your visits  © 2017 [facility] Information is for End User's use only and may not be sold, redistributed or otherwise used for commercial purposes  All illustrations and images included in CareNotes® are the copyrighted property of A D A M , Inc  or Junito Kauffman  The above information is an  only  It is not intended as medical advice for individual conditions or treatments   Talk to your doctor, nurse or pharmacist before following any medical regimen to see if it is safe and effective for you  Otitis Media   WHAT YOU NEED TO KNOW:   Otitis media is an ear infection  DISCHARGE INSTRUCTIONS:   Medicines:  · Ibuprofen or acetaminophen  helps decrease your pain and fever  They are available without a doctor's order  Ask your healthcare provider which medicine is right for you  Ask how much to take and how often to take it  These medicines can cause stomach bleeding if not taken correctly  Ibuprofen can cause kidney damage  Do not take ibuprofen if you have kidney disease, an ulcer, or allergies to aspirin  Acetaminophen can cause liver damage  Do not drink alcohol if you take acetaminophen  · Ear drops  help treat your ear pain  · Antibiotics  help treat a bacterial infection that caused your ear infection  · Take your medicine as directed  Contact your healthcare provider if you think your medicine is not helping or if you have side effects  Tell him or her if you are allergic to any medicine  Keep a list of the medicines, vitamins, and herbs you take  Include the amounts, and when and why you take them  Bring the list or the pill bottles to follow-up visits  Carry your medicine list with you in case of an emergency  Heat or ice:   · Heat  may be used to decrease your pain  Place a warm, moist washcloth on your ear  Apply for 15 to 20 minutes, 3 to 4 times a day    · Ice  helps decrease swelling and pain  Use an ice pack or put crushed ice in a plastic bag  Cover the ice pack with a towel and place it on your ear for 15 to 20 minutes, 3 to 4 times a day for 2 days  Prevent otitis media:   · Wash your hands often  Use soap and water  Wash your hands after you use the bathroom, change a child's diapers, or sneeze  Wash your hands before you prepare or eat food  · Stay away from people who are ill  Some germs are easily and quickly spread through contact  Return to work or school:   You may return to work or school when your fever is gone  Follow up with your healthcare provider as directed:  Write down your questions so you remember to ask them during your visits  Contact your healthcare provider if:   · Your ear pain gets worse or does not go away, even after treatment  · The outside of your ear is red or swollen  · You have vomiting or diarrhea  · You have fluid coming from your ear  · You have questions or concerns about your condition or care  Return to the emergency department if:   · You have a seizure  · You have a fever and a stiff neck  © 2017 [facility] Information is for End User's use only and may not be sold, redistributed or otherwise used for commercial purposes  All illustrations and images included in CareNotes® are the copyrighted property of A D A M , Inc  or Junito Kauffman  The above information is an  only  It is not intended as medical advice for individual conditions or treatments  Talk to your doctor, nurse or pharmacist before following any medical regimen to see if it is safe and effective for you

## 2018-08-24 LAB — B BURGDOR AB SER IA-ACNC: <0.9 INDEX

## 2018-08-28 DIAGNOSIS — M25.50 ARTHRALGIA, UNSPECIFIED JOINT: ICD-10-CM

## 2018-08-28 DIAGNOSIS — M79.10 MYALGIA: Primary | ICD-10-CM

## 2018-08-28 RX ORDER — DOXYCYCLINE 100 MG/1
100 TABLET ORAL 2 TIMES DAILY
Qty: 28 TABLET | Refills: 1 | Status: SHIPPED | OUTPATIENT
Start: 2018-08-28 | End: 2018-09-11

## 2018-08-28 RX ORDER — OXYCODONE HYDROCHLORIDE 5 MG/1
5 TABLET ORAL EVERY 6 HOURS PRN
Qty: 28 TABLET | Refills: 0 | Status: SHIPPED | OUTPATIENT
Start: 2018-08-28 | End: 2020-07-09 | Stop reason: ALTCHOICE

## 2018-08-29 NOTE — TELEPHONE ENCOUNTER
FYI  Home # busy  Attempted 3x  Called cell # - bad connection  However, I was able to confirm with Serenity Hernandez at [facility] that pt picked up gabapentin on 8/22  Pt seen in [facility] ED yesterday and referred to ID

## 2018-11-01 ENCOUNTER — OFFICE VISIT (OUTPATIENT)
Dept: URGENT CARE | Facility: CLINIC | Age: 53
End: 2018-11-01
Payer: COMMERCIAL

## 2018-11-01 VITALS
SYSTOLIC BLOOD PRESSURE: 140 MMHG | TEMPERATURE: 100.5 F | OXYGEN SATURATION: 97 % | HEIGHT: 70 IN | BODY MASS INDEX: 35.1 KG/M2 | WEIGHT: 245.2 LBS | HEART RATE: 85 BPM | RESPIRATION RATE: 20 BRPM | DIASTOLIC BLOOD PRESSURE: 77 MMHG

## 2018-11-01 DIAGNOSIS — R50.9 ELEVATED TEMPERATURE: ICD-10-CM

## 2018-11-01 DIAGNOSIS — R39.9 URINARY SYMPTOM OR SIGN: Primary | ICD-10-CM

## 2018-11-01 LAB
SL AMB  POCT GLUCOSE, UA: NEGATIVE
SL AMB LEUKOCYTE ESTERASE,UA: ABNORMAL
SL AMB POCT BILIRUBIN,UA: ABNORMAL
SL AMB POCT BLOOD,UA: NEGATIVE
SL AMB POCT CLARITY,UA: CLEAR
SL AMB POCT COLOR,UA: ABNORMAL
SL AMB POCT KETONES,UA: NEGATIVE
SL AMB POCT NITRITE,UA: NEGATIVE
SL AMB POCT PH,UA: 5
SL AMB POCT SPECIFIC GRAVITY,UA: 1.01
SL AMB POCT URINE PROTEIN: NEGATIVE
SL AMB POCT UROBILINOGEN: 0.2

## 2018-11-01 PROCEDURE — G0382 LEV 3 HOSP TYPE B ED VISIT: HCPCS | Performed by: NURSE PRACTITIONER

## 2018-11-01 PROCEDURE — S9083 URGENT CARE CENTER GLOBAL: HCPCS | Performed by: NURSE PRACTITIONER

## 2018-11-01 RX ORDER — NICOTINE POLACRILEX 2 MG
GUM BUCCAL
COMMUNITY
Start: 2017-10-03 | End: 2019-08-15 | Stop reason: SDUPTHER

## 2018-11-01 NOTE — PROGRESS NOTES
Assessment/Plan    Urinary symptom or sign [R39 9]  1  Urinary symptom or sign  POCT urine dip    Ambulatory Referral to Emergency Medicine   2  Elevated temperature       - patient is advised that considering his elevated temp for the last several days with no known cause, it will be better for him to go to the emergency department for further evaluation  - also to follow up with his primary care to rule out BPH  - urine dipstick at the clinic is negative except for small leukocyte esterase  - did not sent urine sample for culture given that patient is going to the emergency department  Subjective:  Presents to the clinic with complaint of fever and urinary symptoms     Patient ID: Sushil Marvin is a 46 y o  male  Reason For Visit / Chief Complaint  Chief Complaint   Patient presents with    Cold Like Symptoms     monday pt started with off/on fever, aching joints, urgency which is new  pt states he has no n/v, no cough, and no congestion  pt states he gets sob while talking  pt is taking advil & tylenol prn         HPI  He reports he has been having fever of about 100 and 101 degrees at home  Today at the clinic his temp is 100 5 degrees  Denies cold symptoms  No headaches or dizziness  Reports urinary symptoms consisting of a weak stream and difficulty emptying his bladder  States he takes about 5 minutes to empty his bladder  Duration about 1 week  No change in vision, diarrhea, nausea or vomiting  He states that this evening he started having some shortness of breath when talking and some generalized joint pains  No runny nose no sinus symptoms  Past Medical History:   Diagnosis Date    GERD (gastroesophageal reflux disease)        Past Surgical History:   Procedure Laterality Date    CHOLECYSTECTOMY      [place] EDG US EXAM SURGICAL ALTER STOM DUODENUM/JEJUNUM N/A 10/23/2017    Procedure: RADIAL ENDOSCOPIC U/S;  Surgeon: Katlyn Merida MD;  Location: [facility];   Service: Gastroenterology       Family History   Problem Relation Age of Onset    Diabetes Mother     Heart disease Mother     Diabetes Father     Heart disease Father        Review of Systems   Constitutional: Positive for chills and fatigue  Respiratory: Positive for shortness of breath  Negative for cough, chest tightness and wheezing (as above)  Cardiovascular: Negative for chest pain and palpitations  Gastrointestinal: Negative for abdominal pain, diarrhea, nausea and vomiting  Endocrine: Positive for polyuria  Negative for polydipsia and polyphagia  Genitourinary: Positive for difficulty urinating ( as above)  Negative for dysuria, hematuria and testicular pain  Neurological: Negative for dizziness, syncope and headaches  Objective:    /77 (Patient Position: Sitting)   Pulse 85   Temp 100 5 °F (38 1 °C) (Tympanic)   Resp 20   Ht 5' 10" (1 778 m)   Wt 111 kg (245 lb 3 2 oz)   SpO2 97%   BMI 35 18 kg/m²     Physical Exam   Constitutional: He is oriented to person, place, and time  HENT:   Right Ear: External ear normal    Left Ear: External ear normal    Mouth/Throat: Oropharynx is clear and moist  No oropharyngeal exudate  Cardiovascular: Normal rate, regular rhythm and normal heart sounds  Pulmonary/Chest: Effort normal and breath sounds normal  No respiratory distress  He has no wheezes  Lymphadenopathy:     He has no cervical adenopathy  Neurological: He is alert and oriented to person, place, and time

## 2018-11-20 DIAGNOSIS — N41.0 ACUTE PROSTATITIS: Primary | ICD-10-CM

## 2018-11-20 RX ORDER — CIPROFLOXACIN 500 MG/1
500 TABLET, FILM COATED ORAL EVERY 12 HOURS SCHEDULED
Qty: 28 TABLET | Refills: 0 | Status: SHIPPED | OUTPATIENT
Start: 2018-11-20 | End: 2018-12-04

## 2018-12-07 DIAGNOSIS — N41.0 ACUTE PROSTATITIS: Primary | ICD-10-CM

## 2018-12-07 DIAGNOSIS — Z12.5 SCREENING FOR PROSTATE CANCER: ICD-10-CM

## 2018-12-07 DIAGNOSIS — Z00.00 HEALTH CARE MAINTENANCE: ICD-10-CM

## 2019-08-09 ENCOUNTER — TELEPHONE (OUTPATIENT)
Dept: CARDIOLOGY CLINIC | Facility: CLINIC | Age: 54
End: 2019-08-09

## 2019-08-09 NOTE — TELEPHONE ENCOUNTER
Spoke with patient's wife to see if he has been to a cardiologist before or if he has had any recent labs done   Leidy denied recent labs and prior cardiologist

## 2019-08-15 ENCOUNTER — OFFICE VISIT (OUTPATIENT)
Dept: CARDIOLOGY CLINIC | Facility: CLINIC | Age: 54
End: 2019-08-15
Payer: COMMERCIAL

## 2019-08-15 VITALS
SYSTOLIC BLOOD PRESSURE: 142 MMHG | DIASTOLIC BLOOD PRESSURE: 92 MMHG | WEIGHT: 253.6 LBS | HEART RATE: 91 BPM | BODY MASS INDEX: 36.31 KG/M2 | HEIGHT: 70 IN

## 2019-08-15 DIAGNOSIS — Z76.89 ENCOUNTER TO ESTABLISH CARE: Primary | ICD-10-CM

## 2019-08-15 PROCEDURE — 93000 ELECTROCARDIOGRAM COMPLETE: CPT | Performed by: INTERNAL MEDICINE

## 2019-08-15 PROCEDURE — 99203 OFFICE O/P NEW LOW 30 MIN: CPT | Performed by: INTERNAL MEDICINE

## 2019-08-15 NOTE — PROGRESS NOTES
Cardiology Follow Up    [facility]  1965  [id number]  [facility]  [address] [address] [facility]   [phone]    1  Encounter to establish care  POCT ECG       Interval History:   Cardiology consultation  Pleasant 50-year-old male who had no previous cardiac history  And mostly asymptomatic at the present time without any chest pain or significant dyspnea  He does have a strong family history of premature coronary disease, Father  in the mid 46s of myocardial infarction moderate in early 62s and her sister just recently passed away of a myocardial infarction age 77  The patient cell has no history of dyslipidemia he tells me his lipid profile checked couple years ago and he was okay  He was taking aspirin therapy for but he took himself off of that because of risk the recent news  Used to be very active exercise wise, he did suffer some back problems and he has been mostly sedentary, he gained close to 30 lb in the last year  He admits to be under lot of emotional stress at work  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 [employer]    He smokes cigars, he does that intermittently, he has been doing that most of his adult life, he states he quit about 2 weeks ago                                              Patient Active Problem List   Diagnosis    Cervicalgia    Other fatigue    Posterior auricular pain of right ear    Occipital neuralgia of right side    Worsening headaches     Past Medical History:   Diagnosis Date    GERD (gastroesophageal reflux disease)      Social History     Socioeconomic History    Marital status: Single     Spouse name: Not on file    Number of children: Not on file    Years of education: Not on file    Highest education level: Not on file   Occupational History    Not on file   Social Needs    Financial resource strain: Not on file    Food insecurity:     Worry: Not on file     Inability: Not on file    Transportation needs:     Medical: Not on file     Non-medical: Not on file   Tobacco Use    Smoking status: Light Tobacco Smoker     Types: Cigars    Smokeless tobacco: Never Used   Substance and Sexual Activity    Alcohol use: Yes     Comment: social    Drug use: No    Sexual activity: Not on file   Lifestyle    Physical activity:     Days per week: Not on file     Minutes per session: Not on file    Stress: Not on file   Relationships    Social connections:     Talks on phone: Not on file     Gets together: Not on file     Attends Druze service: Not on file     Active member of club or organization: Not on file     Attends meetings of clubs or organizations: Not on file     Relationship status: Not on file    Intimate partner violence:     Fear of current or ex partner: Not on file     Emotionally abused: Not on file     Physically abused: Not on file     Forced sexual activity: Not on file   Other Topics Concern    Not on file   Social History Narrative    Not on file      Family History   Problem Relation Age of Onset    Diabetes Mother     Heart disease Mother     Diabetes Father     Heart disease Father      Past Surgical History:   Procedure Laterality Date    CHOLECYSTECTOMY      [place] EDG US EXAM SURGICAL ALTER STOM DUODENUM/JEJUNUM N/A 10/23/2017    Procedure: RADIAL ENDOSCOPIC U/S;  Surgeon: Brandon Worthington MD;  Location: [facility];   Service: Gastroenterology       Current Outpatient Medications:     Biotin 1 MG CAPS, Take by mouth, Disp: , Rfl:     cholecalciferol (VITAMIN D3) 1,000 units tablet, Take 1 capsule by mouth daily , Disp: , Rfl:     Magnesium 200 MG TABS, Take by mouth, Disp: , Rfl:     Melatonin 1 MG CAPS, Take by mouth, Disp: , Rfl:     Multiple Vitamin (MULTIVITAMIN) capsule, Take 1 capsule by mouth daily, Disp: , Rfl:     Potassium 75 MG TABS, Take by mouth, Disp: , Rfl:   SUPER B COMPLEX/C PO, Take 1 tablet by mouth daily, Disp: , Rfl:     aspirin (ECOTRIN LOW STRENGTH) 81 mg EC tablet, Take 81 mg by mouth daily, Disp: , Rfl:     esomeprazole (NexIUM) 20 mg capsule, Take 20 mg by mouth 2 (two) times a day before meals, Disp: , Rfl:     gabapentin (NEURONTIN) 300 mg capsule, 1 capsule PO tid (Patient not taking: Reported on 11/1/2018 ), Disp: 90 capsule, Rfl: 0    Methylprednisolone 4 MG TBPK, Use as directed on package (Patient not taking: Reported on 11/1/2018 ), Disp: 21 tablet, Rfl: 0    omeprazole (PRILOSEC OTC) 20 MG tablet, Take by mouth, Disp: , Rfl:     oxyCODONE (ROXICODONE) 5 mg immediate release tablet, Take 1 tablet (5 mg total) by mouth every 6 (six) hours as needed for moderate pain Max Daily Amount: 20 mg (Patient not taking: Reported on 11/1/2018 ), Disp: 28 tablet, Rfl: 0  No Known Allergies    Labs:  No visits with results within 6 Month(s) from this visit  Latest known visit with results is:   Office Visit on 11/01/2018   Component Date Value    LEUKOCYTE ESTERASE,UA 11/01/2018 small     NITRITE,UA 11/01/2018 negative     SL AMB POCT UROBILINOGEN 11/01/2018 0 2     POCT URINE PROTEIN 11/01/2018 negative      PH,UA 11/01/2018 5 0     BLOOD,UA 11/01/2018 negative     SPECIFIC GRAVITY,UA 11/01/2018 1 015     KETONES,UA 11/01/2018 negative     BILIRUBIN,UA 11/01/2018 small     GLUCOSE, UA 11/01/2018 negative      COLOR,UA 11/01/2018 timothy     CLARITY,UA 11/01/2018 clear      Imaging: No results found  Review of Systems:  Review of Systems   Constitutional: Positive for fatigue  Negative for activity change, appetite change, chills, diaphoresis, fever and unexpected weight change  HENT: Negative for congestion, hearing loss, nosebleeds and trouble swallowing  Eyes: Negative for visual disturbance  Respiratory: Negative for apnea, cough, choking, chest tightness, shortness of breath, wheezing and stridor      Cardiovascular: Negative for chest pain, palpitations and leg swelling  Gastrointestinal: Negative for abdominal distention, abdominal pain, anal bleeding, blood in stool, constipation, diarrhea, nausea, rectal pain and vomiting  Endocrine: Negative for cold intolerance and heat intolerance  Genitourinary: Negative for difficulty urinating, dysuria, flank pain, frequency, hematuria and urgency  Musculoskeletal: Positive for back pain  Negative for arthralgias, gait problem, joint swelling and myalgias  Skin: Negative for color change, pallor and rash  Allergic/Immunologic: Negative for immunocompromised state  Neurological: Negative for dizziness, tremors, seizures, syncope, facial asymmetry, speech difficulty, weakness, light-headedness, numbness and headaches  Hematological: Does not bruise/bleed easily  Psychiatric/Behavioral: Positive for sleep disturbance  Negative for behavioral problems and decreased concentration  The patient is not nervous/anxious  Physical Exam:  Physical Exam   Constitutional: He is oriented to person, place, and time  He appears well-developed  Non-toxic appearance  He does not appear ill  No distress ( obese)  HENT:   Head: Normocephalic  Neck: No hepatojugular reflux and no JVD present  No tracheal deviation present  No thyromegaly present  Cardiovascular: Normal rate, regular rhythm, normal heart sounds and intact distal pulses  No extrasystoles are present  Exam reveals no gallop, no friction rub and no decreased pulses  No murmur heard  Pulmonary/Chest: Effort normal and breath sounds normal  No accessory muscle usage or stridor  No apnea, no tachypnea and no bradypnea  No respiratory distress  Abdominal: Soft  Bowel sounds are normal  He exhibits no distension, no ascites and no mass  There is no splenomegaly or hepatomegaly  There is no tenderness  There is no rebound and no guarding  Musculoskeletal:        Right lower leg: He exhibits no edema          Left lower leg: He exhibits no edema  Neurological: He is alert and oriented to person, place, and time  Skin: Skin is warm and dry  Capillary refill takes less than 2 seconds  No rash noted  He is not diaphoretic  No cyanosis or erythema  No pallor  Nails show no clubbing  Psychiatric: He has a normal mood and affect  His behavior is normal        Discussion/Summary:  Strong family history of premature coronary disease, his examination is unrevealing, his EKG is normal   Will do basic noninvasive evaluation with stress test and echocardiogram as well as check lipid profile, will consider statin therapy perhaps aspirin therapy pending results of the testing  Lifestyle modification recommended including regular exercise program weight management and avoidance of tobacco   Consideration for a CT calcium score will be made pending the results of the testing  This note was completed in part utilizing [employer] direct voice recognition software  Grammatical errors, random word insertion, spelling mistakes, and incomplete sentences may be an occasional consequence of the system secondary to software limitations, ambient noise and hardware issues  At the time of dictation, efforts were made to edit, clarify and /or correct errors  Please read the chart carefully and recognize, using context, where substitutions have occurred  If you have any questions or concerns about the context, text or information contained within the body of this dictation, please contact myself, the provider, for further clarification

## 2019-09-23 DIAGNOSIS — R39.15 URGENCY OF URINATION: Primary | ICD-10-CM

## 2019-09-23 RX ORDER — CIPROFLOXACIN 500 MG/1
500 TABLET, FILM COATED ORAL EVERY 12 HOURS SCHEDULED
Qty: 20 TABLET | Refills: 0 | Status: SHIPPED | OUTPATIENT
Start: 2019-09-23 | End: 2019-10-03

## 2019-09-27 ENCOUNTER — HOSPITAL ENCOUNTER (OUTPATIENT)
Dept: NON INVASIVE DIAGNOSTICS | Facility: CLINIC | Age: 54
Discharge: HOME | End: 2019-09-27
Payer: COMMERCIAL

## 2019-09-27 DIAGNOSIS — Z76.89 ENCOUNTER TO ESTABLISH CARE: ICD-10-CM

## 2019-09-27 PROCEDURE — 93016 CV STRESS TEST SUPVJ ONLY: CPT | Performed by: INTERNAL MEDICINE

## 2019-09-27 PROCEDURE — 93017 CV STRESS TEST TRACING ONLY: CPT

## 2019-09-27 PROCEDURE — 93018 CV STRESS TEST I&R ONLY: CPT | Performed by: INTERNAL MEDICINE

## 2019-09-27 PROCEDURE — 93306 TTE W/DOPPLER COMPLETE: CPT

## 2019-09-27 PROCEDURE — 93306 TTE W/DOPPLER COMPLETE: CPT | Performed by: INTERNAL MEDICINE

## 2019-09-30 ENCOUNTER — TELEPHONE (OUTPATIENT)
Dept: CARDIOLOGY CLINIC | Facility: CLINIC | Age: 54
End: 2019-09-30

## 2019-09-30 DIAGNOSIS — R06.02 SOB (SHORTNESS OF BREATH): Primary | ICD-10-CM

## 2019-09-30 NOTE — TELEPHONE ENCOUNTER
----- Message from Erasto Meyer MD sent at 9/30/2019  7:33 AM EDT -----  Please call the patient regarding his normal result echo nl as well, consider ct calcium score

## 2019-10-30 DIAGNOSIS — Z12.5 PROSTATE CANCER SCREENING: Primary | ICD-10-CM

## 2019-10-30 DIAGNOSIS — R30.0 DYSURIA: ICD-10-CM

## 2020-06-24 DIAGNOSIS — E55.9 VITAMIN D DEFICIENCY: Primary | ICD-10-CM

## 2020-06-24 DIAGNOSIS — R53.83 TIREDNESS: ICD-10-CM

## 2020-06-24 DIAGNOSIS — E53.8 VITAMIN B 12 DEFICIENCY: ICD-10-CM

## 2020-06-24 DIAGNOSIS — R06.83 SNORING: ICD-10-CM

## 2020-06-24 NOTE — PROGRESS NOTES
Patient C/O tired all the time  Admits to snoring, awakening with choking or gasping  Lab and sleep consultation/treament requested

## 2020-07-07 LAB
25(OH)D3 SERPL-MCNC: 25 NG/ML (ref 30–100)
ALBUMIN SERPL-MCNC: 4.3 G/DL (ref 3.6–5.1)
ALBUMIN/GLOB SERPL: 1.7 (CALC) (ref 1–2.5)
ALP SERPL-CCNC: 84 U/L (ref 35–144)
ALT SERPL-CCNC: 45 U/L (ref 9–46)
AST SERPL-CCNC: 24 U/L (ref 10–35)
BASOPHILS # BLD AUTO: 36 CELLS/UL (ref 0–200)
BASOPHILS NFR BLD AUTO: 0.4 %
BILIRUB DIRECT SERPL-MCNC: 0.1 MG/DL
BILIRUB INDIRECT SERPL-MCNC: 0.5 MG/DL (CALC) (ref 0.2–1.2)
BILIRUB SERPL-MCNC: 0.6 MG/DL (ref 0.2–1.2)
BUN SERPL-MCNC: 14 MG/DL (ref 7–25)
BUN/CREAT SERPL: NORMAL (CALC) (ref 6–22)
CALCIUM SERPL-MCNC: 9 MG/DL (ref 8.6–10.3)
CHLORIDE SERPL-SCNC: 104 MMOL/L (ref 98–110)
CO2 SERPL-SCNC: 27 MMOL/L (ref 20–32)
CREAT SERPL-MCNC: 0.83 MG/DL (ref 0.7–1.33)
EOSINOPHIL # BLD AUTO: 214 CELLS/UL (ref 15–500)
EOSINOPHIL NFR BLD AUTO: 2.4 %
ERYTHROCYTE [DISTWIDTH] IN BLOOD BY AUTOMATED COUNT: 13.6 % (ref 11–15)
GLOBULIN SER CALC-MCNC: 2.5 G/DL (CALC) (ref 1.9–3.7)
GLUCOSE SERPL-MCNC: 127 MG/DL (ref 65–139)
HCT VFR BLD AUTO: 48.2 % (ref 38.5–50)
HGB BLD-MCNC: 16.3 G/DL (ref 13.2–17.1)
LYMPHOCYTES # BLD AUTO: 2056 CELLS/UL (ref 850–3900)
LYMPHOCYTES NFR BLD AUTO: 23.1 %
MCH RBC QN AUTO: 30.7 PG (ref 27–33)
MCHC RBC AUTO-ENTMCNC: 33.8 G/DL (ref 32–36)
MCV RBC AUTO: 90.8 FL (ref 80–100)
MONOCYTES # BLD AUTO: 1130 CELLS/UL (ref 200–950)
MONOCYTES NFR BLD AUTO: 12.7 %
NEUTROPHILS # BLD AUTO: 5465 CELLS/UL (ref 1500–7800)
NEUTROPHILS NFR BLD AUTO: 61.4 %
PLATELET # BLD AUTO: 190 THOUSAND/UL (ref 140–400)
PMV BLD REES-ECKER: 10.6 FL (ref 7.5–12.5)
POTASSIUM SERPL-SCNC: 4.4 MMOL/L (ref 3.5–5.3)
PROT SERPL-MCNC: 6.8 G/DL (ref 6.1–8.1)
RBC # BLD AUTO: 5.31 MILLION/UL (ref 4.2–5.8)
SL AMB EGFR AFRICAN AMERICAN: 115 ML/MIN/1.73M2
SL AMB EGFR NON AFRICAN AMERICAN: 99 ML/MIN/1.73M2
SODIUM SERPL-SCNC: 139 MMOL/L (ref 135–146)
TSH SERPL-ACNC: 6.73 MIU/L (ref 0.4–4.5)
VIT B12 SERPL-MCNC: 462 PG/ML (ref 200–1100)
WBC # BLD AUTO: 8.9 THOUSAND/UL (ref 3.8–10.8)

## 2020-07-09 ENCOUNTER — OFFICE VISIT (OUTPATIENT)
Dept: SLEEP CENTER | Facility: CLINIC | Age: 55
End: 2020-07-09
Payer: COMMERCIAL

## 2020-07-09 VITALS
BODY MASS INDEX: 36.08 KG/M2 | SYSTOLIC BLOOD PRESSURE: 126 MMHG | DIASTOLIC BLOOD PRESSURE: 80 MMHG | WEIGHT: 252 LBS | HEIGHT: 70 IN

## 2020-07-09 DIAGNOSIS — R53.83 TIREDNESS: ICD-10-CM

## 2020-07-09 DIAGNOSIS — R06.83 SNORING: ICD-10-CM

## 2020-07-09 PROCEDURE — 99204 OFFICE O/P NEW MOD 45 MIN: CPT | Performed by: INTERNAL MEDICINE

## 2020-07-09 NOTE — PROGRESS NOTES
Consultation - [id number] : 1965  MRN: [id number]      Assessment:  52 yo male referred to the office for symptoms of sleep apnea  His symptoms consist snoring, apnea during his sleep, waking up choking and gasping for air  His symptoms are concerning for obstructive sleep apnea, and we will order a home sleep study  Plan:  Home sleep study    Follow up: After sleep study to discuss about treatment modalities  History of Present Illness:   53 y  o male who is referred to the office from his PCP due to complaints of snoring, choking and gasping for air  Reports that these symptoms are not consistent, they happened a couple of nights a week  Patient reports that he feels drowsy during the day and often naps once he get home from work  ]  He goes to bed around 9-10pm and wakes up around 5:30 am      His Admire sleep scale is 10         I have reviewed and updated the review of systems as necessary    Historical Information    Past Medical History:  Past Medical History:   Diagnosis Date    GERD (gastroesophageal reflux disease)          Family History: non-contributory    Social History     Socioeconomic History    Marital status: Single     Spouse name: None    Number of children: None    Years of education: None    Highest education level: None   Occupational History    None   Social Needs    Financial resource strain: None    Food insecurity:     Worry: None     Inability: None    Transportation needs:     Medical: None     Non-medical: None   Tobacco Use    Smoking status: Light Tobacco Smoker     Types: Cigars    Smokeless tobacco: Never Used   Substance and Sexual Activity    Alcohol use: Yes     Comment: social    Drug use: No    Sexual activity: None   Lifestyle    Physical activity:     Days per week: None     Minutes per session: None    Stress: None   Relationships    Social connections:     Talks on phone: None     Gets together: None     Attends Mormonism service: None     Active member of club or organization: None     Attends meetings of clubs or organizations: None     Relationship status: None    Intimate partner violence:     Fear of current or ex partner: None     Emotionally abused: None     Physically abused: None     Forced sexual activity: None   Other Topics Concern    None   Social History Narrative    None         Sleep Schedule: unremarkable    Snoring:  yes    Witnessed Apnea:  yes    Medications/Allergies:    Current Outpatient Medications:     Ascorbic Acid (VITAMIN C PO), Take by mouth, Disp: , Rfl:     Biotin 1 MG CAPS, Take by mouth, Disp: , Rfl:     cholecalciferol (VITAMIN D3) 1,000 units tablet, Take 1 capsule by mouth daily , Disp: , Rfl:     esomeprazole (NexIUM) 20 mg capsule, Take 20 mg by mouth 2 (two) times a day before meals, Disp: , Rfl:     Magnesium 200 MG TABS, Take by mouth, Disp: , Rfl:     milk thistle 175 MG tablet, Take 175 mg by mouth daily, Disp: , Rfl:     Multiple Vitamin (MULTIVITAMIN) capsule, Take 1 capsule by mouth daily, Disp: , Rfl:     Potassium 75 MG TABS, Take by mouth, Disp: , Rfl:     SUPER B COMPLEX/C PO, Take 1 tablet by mouth daily, Disp: , Rfl:         Oriel Sea Salt, MA  7/9/2020  4:00 PM  Sign at close encounter    Review of Systems      Genitourinary none   Cardiology none   Gastrointestinal frequent heartburn/acid reflux   Neurology none   Constitutional fatigue and excessive sweating at night   Integumentary none   Psychiatry anxiety and aggressiveness or irritability   Musculoskeletal joint pain and back pain   Pulmonary snoring   ENT none   Endocrine none   Hematological none                              Objective:    Vital Signs:   Vitals:    07/09/20 1600   BP: 126/80   Weight: 114 kg (252 lb)   Height: 5' 10" (1 778 m)     Neck Circumference: 17      Norris Sleepiness Scale:  Total score: 10    Physical Exam:    General: Alert, appropriate, cooperative    Head: NC/AT, no retrognathia    Nose: No septal deviation, nares mildly obstructed, mucosa normal    Throat: Airway diminished, tongue base thickened, no tonsils visualized    Neck: Normal, no thyromegaly or lymphadenopathy, no JVD    Heart: RR, normal S1 and S2, no murmurs    Chest: Clear bilaterally    Extremity: No clubbing, cyanosis, no edema    Skin: Warm, dry    Neuro: No motor abnormalities, cranial nerves appear intact    Sleep Study Results:   No sleep study in record     Counseling / Coordination of Care  A description of the counseling / coordination of care: We discussed the pathophysiology of obstructive sleep apnea as well as the possible treatment options  We also discussed the rationale for positive airway pressure therapy  Triny Crowe MD  07/09/20       Portions of the record may have been created with voice recognition software  Occasional wrong word or "sound a like" substitutions may have occurred due to the inherent limitations of voice recognition software  Read the chart carefully and recognize, using context, where substitutions have occurred

## 2020-07-09 NOTE — PATIENT INSTRUCTIONS
Sleep Apnea   AMBULATORY CARE:   Sleep apnea  is also called obstructive sleep apnea  It is a condition that causes you to stop breathing for 10 seconds or more while you are sleeping  During normal sleep, your throat is kept open by muscles that let air pass through easily  Sleep apnea causes the muscles and tissues around your throat to relax and block air from passing through  Sleep apnea may happen many times while you are asleep  Common symptoms include the following:   · No signs of breathing for 10 seconds or more while you sleep    · Snoring loudly, snorting, gasping or choking while you sleep, and waking up suddenly because of these    · A hard time thinking, remembering things, or focusing on your tasks the following day    · Headache or nausea    · Bedwetting or waking up often during the night to urinate    · Feeling sleepy, slow, and tired during the day  Seek care immediately if:   · You have chest pain or trouble breathing  Contact your healthcare provider if:   · You feel tired or depressed  · You have trouble staying awake during the day  · You have trouble thinking clearly  · You have questions or concerns about your condition or care  Treatment for sleep apnea  includes using a continuous positive airway pressure (CPAP) machine to keep your airway open during sleep  A mask is placed over your nose and mouth, or just your nose  The mask is hooked to the CPAP machine that blows a gentle stream of air into the mask when you breathe  This helps keep your airway open so you can breathe more regularly  Extra oxygen may be given to you through the machine  You may be given a mouth device  It looks like a mouth guard or dental retainer and stops your tongue and mouth tissues from blocking your throat while you sleep  Surgery may be needed to remove extra tissues that block your mouth, throat, or nose  Manage sleep apnea:   · Do not smoke    Nicotine and other chemicals in cigarettes and cigars can cause lung damage  Ask your healthcare provider for information if you currently smoke and need help to quit  E-cigarettes or smokeless tobacco still contain nicotine  Talk to your healthcare provider before you use these products  · Do not drink alcohol or take sedative medicine before you go to sleep  Alcohol and sedatives can relax the muscles and tissues around your throat  This can block the airflow to your lungs  · Maintain a healthy weight  Excess tissue around your throat may restrict your breathing  Ask your healthcare provider for information if you need to lose weight  · Sleep on your side or use pillows designed to prevent sleep apnea  This prevents your tongue or other tissues from blocking your throat  You can also raise the head of your bed  Follow up with your healthcare provider as directed:  Write down your questions so you remember to ask them during your visits  © 2017 [address] Information is for End User's use only and may not be sold, redistributed or otherwise used for commercial purposes  All illustrations and images included in CareNotes® are the copyrighted property of A D A M [employer] Inc  or Junito Kauffman  The above information is an  only  It is not intended as medical advice for individual conditions or treatments  Talk to your doctor, nurse or pharmacist before following any medical regimen to see if it is safe and effective for you

## 2020-07-09 NOTE — PROGRESS NOTES
Review of Systems      Genitourinary none   Cardiology none   Gastrointestinal frequent heartburn/acid reflux   Neurology none   Constitutional fatigue and excessive sweating at night   Integumentary none   Psychiatry anxiety and aggressiveness or irritability   Musculoskeletal joint pain and back pain   Pulmonary snoring   ENT none   Endocrine none   Hematological none

## 2020-10-04 ENCOUNTER — HOSPITAL ENCOUNTER (OUTPATIENT)
Dept: SLEEP CENTER | Facility: CLINIC | Age: 55
Discharge: HOME | End: 2020-10-04
Payer: COMMERCIAL

## 2020-10-04 DIAGNOSIS — R53.83 TIREDNESS: ICD-10-CM

## 2020-10-04 DIAGNOSIS — R06.83 SNORING: ICD-10-CM

## 2020-10-04 PROCEDURE — G0399 HOME SLEEP TEST/TYPE 3 PORTA: HCPCS

## 2020-10-08 DIAGNOSIS — G47.33 OSA (OBSTRUCTIVE SLEEP APNEA): Primary | ICD-10-CM

## 2020-10-09 ENCOUNTER — TELEPHONE (OUTPATIENT)
Dept: SLEEP CENTER | Facility: CLINIC | Age: 55
End: 2020-10-09

## 2020-10-19 ENCOUNTER — TELEPHONE (OUTPATIENT)
Dept: FAMILY MEDICINE CLINIC | Facility: CLINIC | Age: 55
End: 2020-10-19

## 2020-10-28 ENCOUNTER — OFFICE VISIT (OUTPATIENT)
Dept: FAMILY MEDICINE CLINIC | Facility: CLINIC | Age: 55
End: 2020-10-28
Payer: COMMERCIAL

## 2020-10-28 VITALS
HEART RATE: 93 BPM | HEIGHT: 71 IN | TEMPERATURE: 97.2 F | RESPIRATION RATE: 16 BRPM | WEIGHT: 258.4 LBS | DIASTOLIC BLOOD PRESSURE: 80 MMHG | BODY MASS INDEX: 36.18 KG/M2 | OXYGEN SATURATION: 94 % | SYSTOLIC BLOOD PRESSURE: 132 MMHG

## 2020-10-28 DIAGNOSIS — H69.81 DYSFUNCTION OF RIGHT EUSTACHIAN TUBE: ICD-10-CM

## 2020-10-28 DIAGNOSIS — H65.04 RECURRENT ACUTE SEROUS OTITIS MEDIA OF RIGHT EAR: ICD-10-CM

## 2020-10-28 DIAGNOSIS — E78.1 HYPERTRIGLYCERIDEMIA: ICD-10-CM

## 2020-10-28 DIAGNOSIS — E55.9 VITAMIN D DEFICIENCY: ICD-10-CM

## 2020-10-28 DIAGNOSIS — Z12.5 PROSTATE CANCER SCREENING: ICD-10-CM

## 2020-10-28 DIAGNOSIS — Z00.00 HEALTH CARE MAINTENANCE: Primary | ICD-10-CM

## 2020-10-28 DIAGNOSIS — E03.9 HYPOTHYROIDISM, UNSPECIFIED TYPE: ICD-10-CM

## 2020-10-28 PROCEDURE — 3725F SCREEN DEPRESSION PERFORMED: CPT | Performed by: FAMILY MEDICINE

## 2020-10-28 PROCEDURE — 99386 PREV VISIT NEW AGE 40-64: CPT | Performed by: FAMILY MEDICINE

## 2020-10-28 PROCEDURE — 3008F BODY MASS INDEX DOCD: CPT | Performed by: FAMILY MEDICINE

## 2020-10-28 RX ORDER — METHYLPREDNISOLONE 4 MG/1
TABLET ORAL
Qty: 21 EACH | Refills: 1 | Status: SHIPPED | OUTPATIENT
Start: 2020-10-28 | End: 2021-09-27

## 2020-10-28 RX ORDER — FLUTICASONE PROPIONATE 50 MCG
1 SPRAY, SUSPENSION (ML) NASAL DAILY
Qty: 1 BOTTLE | Refills: 1 | Status: SHIPPED | OUTPATIENT
Start: 2020-10-28 | End: 2021-09-27

## 2021-09-27 ENCOUNTER — HOSPITAL ENCOUNTER (EMERGENCY)
Facility: HOSPITAL | Age: 56
Discharge: HOME | End: 2021-09-27
Attending: EMERGENCY MEDICINE
Payer: COMMERCIAL

## 2021-09-27 ENCOUNTER — APPOINTMENT (EMERGENCY)
Dept: RADIOLOGY | Facility: HOSPITAL | Age: 56
End: 2021-09-27
Payer: COMMERCIAL

## 2021-09-27 VITALS
DIASTOLIC BLOOD PRESSURE: 91 MMHG | TEMPERATURE: 98 F | HEART RATE: 90 BPM | WEIGHT: 250 LBS | BODY MASS INDEX: 34.87 KG/M2 | OXYGEN SATURATION: 95 % | RESPIRATION RATE: 20 BRPM | SYSTOLIC BLOOD PRESSURE: 137 MMHG

## 2021-09-27 DIAGNOSIS — S93.492A SPRAIN OF ANTERIOR TALOFIBULAR LIGAMENT OF LEFT ANKLE, INITIAL ENCOUNTER: Primary | ICD-10-CM

## 2021-09-27 DIAGNOSIS — S20.212A CHEST WALL CONTUSION, LEFT, INITIAL ENCOUNTER: ICD-10-CM

## 2021-09-27 PROCEDURE — 73610 X-RAY EXAM OF ANKLE: CPT

## 2021-09-27 PROCEDURE — 99284 EMERGENCY DEPT VISIT MOD MDM: CPT | Performed by: INTERNAL MEDICINE

## 2021-09-27 PROCEDURE — 90715 TDAP VACCINE 7 YRS/> IM: CPT | Performed by: INTERNAL MEDICINE

## 2021-09-27 PROCEDURE — 99283 EMERGENCY DEPT VISIT LOW MDM: CPT

## 2021-09-27 PROCEDURE — 90471 IMMUNIZATION ADMIN: CPT

## 2021-09-27 PROCEDURE — 71101 X-RAY EXAM UNILAT RIBS/CHEST: CPT

## 2021-09-27 RX ORDER — IBUPROFEN 600 MG/1
600 TABLET ORAL ONCE
Status: COMPLETED | OUTPATIENT
Start: 2021-09-27 | End: 2021-09-27

## 2021-09-27 RX ORDER — ACETAMINOPHEN 325 MG/1
975 TABLET ORAL ONCE
Status: COMPLETED | OUTPATIENT
Start: 2021-09-27 | End: 2021-09-27

## 2021-09-27 RX ADMIN — TETANUS TOXOID, REDUCED DIPHTHERIA TOXOID AND ACELLULAR PERTUSSIS VACCINE, ADSORBED 0.5 ML: 5; 2.5; 8; 8; 2.5 SUSPENSION INTRAMUSCULAR at 19:18

## 2021-09-27 RX ADMIN — IBUPROFEN 600 MG: 600 TABLET, FILM COATED ORAL at 19:17

## 2021-09-27 RX ADMIN — ACETAMINOPHEN 975 MG: 325 TABLET, FILM COATED ORAL at 19:17

## 2021-10-05 ENCOUNTER — HOSPITAL ENCOUNTER (OUTPATIENT)
Dept: VASCULAR ULTRASOUND | Facility: HOSPITAL | Age: 56
Discharge: HOME | End: 2021-10-05
Payer: COMMERCIAL

## 2021-10-05 DIAGNOSIS — M79.661 RIGHT CALF PAIN: Primary | ICD-10-CM

## 2021-10-05 DIAGNOSIS — M79.661 RIGHT CALF PAIN: ICD-10-CM

## 2021-10-05 PROCEDURE — 93970 EXTREMITY STUDY: CPT | Performed by: SURGERY

## 2021-10-05 PROCEDURE — 93970 EXTREMITY STUDY: CPT

## 2022-07-08 DIAGNOSIS — J02.9 SORE THROAT: Primary | ICD-10-CM

## 2022-07-08 RX ORDER — AMOXICILLIN 875 MG/1
875 TABLET, COATED ORAL 2 TIMES DAILY
Qty: 14 TABLET | Refills: 0 | Status: SHIPPED | OUTPATIENT
Start: 2022-07-08 | End: 2022-07-15 | Stop reason: ALTCHOICE

## 2022-07-15 DIAGNOSIS — R53.83 TIREDNESS: Primary | ICD-10-CM

## 2022-07-15 RX ORDER — DOXYCYCLINE HYCLATE 100 MG
100 TABLET ORAL 2 TIMES DAILY
Qty: 28 TABLET | Refills: 0 | Status: SHIPPED | OUTPATIENT
Start: 2022-07-15 | End: 2022-07-29

## 2022-07-20 ENCOUNTER — APPOINTMENT (EMERGENCY)
Dept: CT IMAGING | Facility: HOSPITAL | Age: 57
End: 2022-07-20
Payer: COMMERCIAL

## 2022-07-20 ENCOUNTER — HOSPITAL ENCOUNTER (EMERGENCY)
Facility: HOSPITAL | Age: 57
Discharge: HOME | End: 2022-07-21
Attending: EMERGENCY MEDICINE | Admitting: EMERGENCY MEDICINE
Payer: COMMERCIAL

## 2022-07-20 ENCOUNTER — APPOINTMENT (EMERGENCY)
Dept: RADIOLOGY | Facility: HOSPITAL | Age: 57
End: 2022-07-20
Payer: COMMERCIAL

## 2022-07-20 VITALS
RESPIRATION RATE: 17 BRPM | DIASTOLIC BLOOD PRESSURE: 66 MMHG | TEMPERATURE: 98.2 F | OXYGEN SATURATION: 96 % | HEART RATE: 86 BPM | WEIGHT: 240 LBS | BODY MASS INDEX: 33.47 KG/M2 | SYSTOLIC BLOOD PRESSURE: 120 MMHG

## 2022-07-20 DIAGNOSIS — M25.50 ARTHRALGIA, UNSPECIFIED JOINT: ICD-10-CM

## 2022-07-20 DIAGNOSIS — R91.1 PULMONARY NODULE: ICD-10-CM

## 2022-07-20 DIAGNOSIS — R53.1 WEAKNESS: ICD-10-CM

## 2022-07-20 DIAGNOSIS — R53.83 FATIGUE: Primary | ICD-10-CM

## 2022-07-20 DIAGNOSIS — R79.89 ELEVATED TSH: ICD-10-CM

## 2022-07-20 DIAGNOSIS — J01.00 ACUTE MAXILLARY SINUSITIS, RECURRENCE NOT SPECIFIED: ICD-10-CM

## 2022-07-20 LAB
2HR DELTA HS TROPONIN: 0 NG/L
ALBUMIN SERPL BCP-MCNC: 4.2 G/DL (ref 3.5–5)
ALP SERPL-CCNC: 62 U/L (ref 34–104)
ALT SERPL W P-5'-P-CCNC: 36 U/L (ref 7–52)
ANION GAP SERPL CALCULATED.3IONS-SCNC: 7 MMOL/L (ref 4–13)
AST SERPL W P-5'-P-CCNC: 24 U/L (ref 13–39)
BASOPHILS # BLD AUTO: 0.05 THOUSANDS/ΜL (ref 0–0.1)
BASOPHILS NFR BLD AUTO: 1 % (ref 0–1)
BILIRUB SERPL-MCNC: 0.59 MG/DL (ref 0.2–1)
BILIRUB UR QL STRIP: NEGATIVE
BUN SERPL-MCNC: 21 MG/DL (ref 5–25)
CALCIUM SERPL-MCNC: 9.5 MG/DL (ref 8.4–10.2)
CARDIAC TROPONIN I PNL SERPL HS: 2 NG/L
CARDIAC TROPONIN I PNL SERPL HS: 2 NG/L
CHLORIDE SERPL-SCNC: 103 MMOL/L (ref 96–108)
CK SERPL-CCNC: 107 U/L (ref 39–308)
CLARITY UR: CLEAR
CO2 SERPL-SCNC: 27 MMOL/L (ref 21–32)
COLOR UR: YELLOW
CREAT SERPL-MCNC: 0.94 MG/DL (ref 0.6–1.3)
CRP SERPL QL: 2.3 MG/L
D DIMER PPP FEU-MCNC: <0.27 UG/ML FEU
EOSINOPHIL # BLD AUTO: 0.15 THOUSAND/ΜL (ref 0–0.61)
EOSINOPHIL NFR BLD AUTO: 1 % (ref 0–6)
ERYTHROCYTE [DISTWIDTH] IN BLOOD BY AUTOMATED COUNT: 12.7 % (ref 11.6–15.1)
ERYTHROCYTE [SEDIMENTATION RATE] IN BLOOD: 7 MM/HOUR (ref 0–19)
FLUAV RNA RESP QL NAA+PROBE: NEGATIVE
FLUBV RNA RESP QL NAA+PROBE: NEGATIVE
GFR SERPL CREATININE-BSD FRML MDRD: 89 ML/MIN/1.73SQ M
GLUCOSE SERPL-MCNC: 112 MG/DL (ref 65–140)
GLUCOSE UR STRIP-MCNC: NEGATIVE MG/DL
HCT VFR BLD AUTO: 46.1 % (ref 36.5–49.3)
HGB BLD-MCNC: 16 G/DL (ref 12–17)
HGB UR QL STRIP.AUTO: NEGATIVE
IMM GRANULOCYTES # BLD AUTO: 0.05 THOUSAND/UL (ref 0–0.2)
IMM GRANULOCYTES NFR BLD AUTO: 1 % (ref 0–2)
KETONES UR STRIP-MCNC: NEGATIVE MG/DL
LEUKOCYTE ESTERASE UR QL STRIP: NEGATIVE
LIPASE SERPL-CCNC: 61 U/L (ref 11–82)
LYMPHOCYTES # BLD AUTO: 2.57 THOUSANDS/ΜL (ref 0.6–4.47)
LYMPHOCYTES NFR BLD AUTO: 24 % (ref 14–44)
MAGNESIUM SERPL-MCNC: 2.2 MG/DL (ref 1.9–2.7)
MCH RBC QN AUTO: 31.9 PG (ref 26.8–34.3)
MCHC RBC AUTO-ENTMCNC: 34.7 G/DL (ref 31.4–37.4)
MCV RBC AUTO: 92 FL (ref 82–98)
MONOCYTES # BLD AUTO: 1.05 THOUSAND/ΜL (ref 0.17–1.22)
MONOCYTES NFR BLD AUTO: 10 % (ref 4–12)
NEUTROPHILS # BLD AUTO: 7.01 THOUSANDS/ΜL (ref 1.85–7.62)
NEUTS SEG NFR BLD AUTO: 63 % (ref 43–75)
NITRITE UR QL STRIP: NEGATIVE
NRBC BLD AUTO-RTO: 0 /100 WBCS
PH UR STRIP.AUTO: 5 [PH]
PLATELET # BLD AUTO: 230 THOUSANDS/UL (ref 149–390)
PMV BLD AUTO: 9.6 FL (ref 8.9–12.7)
POTASSIUM SERPL-SCNC: 4.6 MMOL/L (ref 3.5–5.3)
PROT SERPL-MCNC: 7.8 G/DL (ref 6.4–8.4)
PROT UR STRIP-MCNC: NEGATIVE MG/DL
RBC # BLD AUTO: 5.02 MILLION/UL (ref 3.88–5.62)
RSV RNA RESP QL NAA+PROBE: NEGATIVE
SARS-COV-2 RNA RESP QL NAA+PROBE: NEGATIVE
SODIUM SERPL-SCNC: 137 MMOL/L (ref 135–147)
SP GR UR STRIP.AUTO: 1.03 (ref 1–1.03)
TSH SERPL DL<=0.05 MIU/L-ACNC: 6.02 UIU/ML (ref 0.45–4.5)
UROBILINOGEN UR STRIP-ACNC: <2 MG/DL
WBC # BLD AUTO: 10.88 THOUSAND/UL (ref 4.31–10.16)

## 2022-07-20 PROCEDURE — 84484 ASSAY OF TROPONIN QUANT: CPT | Performed by: EMERGENCY MEDICINE

## 2022-07-20 PROCEDURE — 85025 COMPLETE CBC W/AUTO DIFF WBC: CPT | Performed by: EMERGENCY MEDICINE

## 2022-07-20 PROCEDURE — 85379 FIBRIN DEGRADATION QUANT: CPT | Performed by: PHYSICIAN ASSISTANT

## 2022-07-20 PROCEDURE — 0241U HB NFCT DS VIR RESP RNA 4 TRGT: CPT | Performed by: PHYSICIAN ASSISTANT

## 2022-07-20 PROCEDURE — 71045 X-RAY EXAM CHEST 1 VIEW: CPT

## 2022-07-20 PROCEDURE — 84439 ASSAY OF FREE THYROXINE: CPT | Performed by: PHYSICIAN ASSISTANT

## 2022-07-20 PROCEDURE — 71250 CT THORAX DX C-: CPT

## 2022-07-20 PROCEDURE — 82550 ASSAY OF CK (CPK): CPT | Performed by: PHYSICIAN ASSISTANT

## 2022-07-20 PROCEDURE — 86618 LYME DISEASE ANTIBODY: CPT | Performed by: PHYSICIAN ASSISTANT

## 2022-07-20 PROCEDURE — G1004 CDSM NDSC: HCPCS

## 2022-07-20 PROCEDURE — 74176 CT ABD & PELVIS W/O CONTRAST: CPT

## 2022-07-20 PROCEDURE — 84443 ASSAY THYROID STIM HORMONE: CPT | Performed by: PHYSICIAN ASSISTANT

## 2022-07-20 PROCEDURE — 80053 COMPREHEN METABOLIC PANEL: CPT | Performed by: EMERGENCY MEDICINE

## 2022-07-20 PROCEDURE — 99284 EMERGENCY DEPT VISIT MOD MDM: CPT

## 2022-07-20 PROCEDURE — 83735 ASSAY OF MAGNESIUM: CPT | Performed by: PHYSICIAN ASSISTANT

## 2022-07-20 PROCEDURE — 93005 ELECTROCARDIOGRAM TRACING: CPT

## 2022-07-20 PROCEDURE — 99285 EMERGENCY DEPT VISIT HI MDM: CPT | Performed by: PHYSICIAN ASSISTANT

## 2022-07-20 PROCEDURE — 85652 RBC SED RATE AUTOMATED: CPT | Performed by: PHYSICIAN ASSISTANT

## 2022-07-20 PROCEDURE — 86308 HETEROPHILE ANTIBODY SCREEN: CPT | Performed by: PHYSICIAN ASSISTANT

## 2022-07-20 PROCEDURE — 81003 URINALYSIS AUTO W/O SCOPE: CPT | Performed by: EMERGENCY MEDICINE

## 2022-07-20 PROCEDURE — 86140 C-REACTIVE PROTEIN: CPT | Performed by: PHYSICIAN ASSISTANT

## 2022-07-20 PROCEDURE — 70450 CT HEAD/BRAIN W/O DYE: CPT

## 2022-07-20 PROCEDURE — 36415 COLL VENOUS BLD VENIPUNCTURE: CPT | Performed by: PHYSICIAN ASSISTANT

## 2022-07-20 PROCEDURE — 83690 ASSAY OF LIPASE: CPT | Performed by: EMERGENCY MEDICINE

## 2022-07-21 LAB
B BURGDOR IGG+IGM SER-ACNC: <0.2 AI
HETEROPH AB SER QL: NEGATIVE
T4 FREE SERPL-MCNC: 1.11 NG/DL (ref 0.76–1.46)

## 2022-07-21 RX ORDER — AMOXICILLIN AND CLAVULANATE POTASSIUM 875; 125 MG/1; MG/1
1 TABLET, FILM COATED ORAL ONCE
Status: DISCONTINUED | OUTPATIENT
Start: 2022-07-21 | End: 2022-07-21

## 2022-07-21 RX ORDER — AMOXICILLIN AND CLAVULANATE POTASSIUM 875; 125 MG/1; MG/1
1 TABLET, FILM COATED ORAL EVERY 12 HOURS
Qty: 20 TABLET | Refills: 0 | Status: SHIPPED | OUTPATIENT
Start: 2022-07-21 | End: 2022-07-21 | Stop reason: CLARIF

## 2022-07-21 NOTE — DISCHARGE INSTRUCTIONS
Follow up with PCP regarding pulmonary nodule  Follow up with rheumatology  Take antibiotic as directed  Return to the ER if anything acutely changes

## 2022-07-22 LAB
ATRIAL RATE: 88 BPM
P AXIS: 46 DEGREES
PR INTERVAL: 184 MS
QRS AXIS: 51 DEGREES
QRSD INTERVAL: 90 MS
QT INTERVAL: 354 MS
QTC INTERVAL: 419 MS
T WAVE AXIS: 48 DEGREES
VENTRICULAR RATE: 84 BPM

## 2022-07-22 PROCEDURE — 93010 ELECTROCARDIOGRAM REPORT: CPT | Performed by: INTERNAL MEDICINE

## 2022-07-22 NOTE — ED PROVIDER NOTES
History  Chief Complaint   Patient presents with    Medical Problem     Pt c/o of fatigue, joint pain, pain around eyes, lack of appetite, abdominal pain, night sweats, and intermittent fever for about two weeks  Pt also c/o of SOB  Denies N,V,D, CP  Pt reports being treated with 2 different abx recently  Patient is a 61 YOM presenting to the ER for evaluation  He states over the last two weeks he has been experiencing multiple different complaints including, migratory joint pain, lack of appetite, abdominal pain, weight loss, fevers, night sweats, and pain around his eyes  His PCP prescribed him amoxicillin followed by doxycycline for his multiple symptoms  Patient denies any rashes or recent travel  He denies any headaches, nausea, vomiting, diarrhea, chest pain  History provided by:  Patient   used: No        Prior to Admission Medications   Prescriptions Last Dose Informant Patient Reported? Taking?    Ascorbic Acid (VITAMIN C PO)   Yes No   Sig: Take by mouth   Biotin 1 MG CAPS  Self Yes No   Sig: Take by mouth   Multiple Vitamin (MULTIVITAMIN) capsule  Self Yes No   Sig: Take 1 capsule by mouth daily   Potassium 75 MG TABS  Self Yes No   Sig: Take by mouth   SUPER B COMPLEX/C PO  Self Yes No   Sig: Take 1 tablet by mouth daily   aspirin (ECOTRIN LOW STRENGTH) 81 mg EC tablet   Yes No   Sig: Take 81 mg by mouth   cholecalciferol (VITAMIN D3) 1,000 units tablet  Self Yes No   Sig: Take 1 capsule by mouth daily    doxycycline hyclate (VIBRA-TABS) 100 mg tablet   No No   Sig: Take 1 tablet (100 mg total) by mouth 2 (two) times a day for 14 days   esomeprazole (NexIUM) 20 mg capsule  Self Yes No   Sig: Take 20 mg by mouth 2 (two) times a day before meals   milk thistle 175 MG tablet   Yes No   Sig: Take 175 mg by mouth daily   sildenafil (VIAGRA) 100 mg tablet   No No   Sig: Take 1 tablet (100 mg total) by mouth daily as needed for erectile dysfunction      Facility-Administered Medications: None       Past Medical History:   Diagnosis Date    GERD (gastroesophageal reflux disease)        Past Surgical History:   Procedure Laterality Date    CHOLECYSTECTOMY      [place] EDG US EXAM SURGICAL ALTER STOM DUODENUM/JEJUNUM N/A 10/23/2017    Procedure: RADIAL ENDOSCOPIC U/S;  Surgeon: Maria Luz Berumen MD;  Location: [facility]; Service: Gastroenterology       Family History   Problem Relation Age of Onset    Diabetes Mother     Heart disease Mother     Diabetes Father     Heart disease Father      I have reviewed and agree with the history as documented  E-Cigarette/Vaping     E-Cigarette/Vaping Substances     Social History     Tobacco Use    Smoking status: Light Tobacco Smoker     Types: Cigars    Smokeless tobacco: Never Used   Substance Use Topics    Alcohol use: Yes     Comment: social    Drug use: No       Review of Systems   Constitutional: Positive for appetite change, fatigue, fever and unexpected weight change  Negative for chills  HENT: Negative for ear pain and sore throat  Eyes: Negative for pain and visual disturbance  Respiratory: Positive for shortness of breath  Negative for cough  Cardiovascular: Negative for chest pain and palpitations  Gastrointestinal: Positive for abdominal pain  Negative for vomiting  Genitourinary: Negative for dysuria and hematuria  Musculoskeletal: Positive for arthralgias  Negative for back pain  Skin: Negative for color change and rash  Neurological: Negative for seizures and syncope  All other systems reviewed and are negative  Physical Exam  Physical Exam  Vitals and nursing note reviewed  Constitutional:       Appearance: He is well-developed  HENT:      Head: Normocephalic and atraumatic  Eyes:      Conjunctiva/sclera: Conjunctivae normal    Cardiovascular:      Rate and Rhythm: Normal rate and regular rhythm  Heart sounds: No murmur heard    Pulmonary:      Effort: Pulmonary effort is normal  No respiratory distress  Breath sounds: Normal breath sounds  Abdominal:      Palpations: Abdomen is soft  Tenderness: There is no abdominal tenderness  Musculoskeletal:      Cervical back: Neck supple  Skin:     General: Skin is warm and dry  Neurological:      Mental Status: He is alert           Vital Signs  ED Triage Vitals [07/20/22 1932]   Temperature Pulse Respirations Blood Pressure SpO2   98 2 °F (36 8 °C) 87 18 127/74 95 %      Temp Source Heart Rate Source Patient Position - Orthostatic VS BP Location FiO2 (%)   Oral Monitor Sitting Left arm --      Pain Score       4           Vitals:    07/20/22 1932 07/20/22 2208   BP: 127/74 120/66   Pulse: 87 86   Patient Position - Orthostatic VS: Sitting        ED Medications  Medications - No data to display    Diagnostic Studies  Results Reviewed     Procedure Component Value Units Date/Time    T4, free [[id number]]  (Normal) Collected: 07/20/22 1938    Lab Status: Final result Specimen: Blood from Arm, Right Updated: 07/21/22 0925     Free T4 1 11 ng/dL     Lyme Antibody Profile with reflex to WB [[id number]]  (Normal) Collected: 07/20/22 2139    Lab Status: Final result Specimen: Blood from Arm, Right Updated: 07/21/22 0823     Lyme Total Antibodies <0 2 AI     Mononucleosis screen [[id number]]  (Normal) Collected: 07/20/22 2139    Lab Status: Final result Specimen: Blood from Arm, Right Updated: 07/21/22 0735     Monotest Negative    UA (URINE) with reflex to Scope [[id number]] Collected: 07/20/22 2251    Lab Status: Final result Specimen: Urine, Clean Catch Updated: 07/20/22 2306     Color, UA Yellow     Clarity, UA Clear     Specific [place], UA 1 030     pH, UA 5 0     Leukocytes, UA Negative     Nitrite, UA Negative     Protein, UA Negative mg/dl      Glucose, UA Negative mg/dl      Ketones, UA Negative mg/dl      Urobilinogen, UA <2 0 mg/dl      Bilirubin, UA Negative     Occult Blood, UA Negative    TSH, 3rd generation with Free T4 reflex [[id number]]  (Abnormal) Collected: 07/20/22 1938    Lab Status: Final result Specimen: Blood from Arm, Right Updated: 07/20/22 2257     TSH 3RD GENERATON 6 023 uIU/mL     Narrative:      Patients undergoing fluorescein dye angiography may retain small amounts of fluorescein in the body for 48-72 hours post procedure  Samples containing fluorescein can produce falsely depressed TSH values  If the patient had this procedure,a specimen should be resubmitted post fluorescein clearance  C-reactive protein [[id number]]  (Normal) Collected: 07/20/22 1938    Lab Status: Final result Specimen: Blood from Arm, Right Updated: 07/20/22 2231     CRP 2 3 mg/L     Magnesium [[id number]]  (Normal) Collected: 07/20/22 1938    Lab Status: Final result Specimen: Blood from Arm, Right Updated: 07/20/22 2229     Magnesium 2 2 mg/dL     CK [[id number]]  (Normal) Collected: 07/20/22 1938    Lab Status: Final result Specimen: Blood from Arm, Right Updated: 07/20/22 2229     Total  U/L     COVID/FLU/RSV [[id number]]  (Normal) Collected: 07/20/22 2139    Lab Status: Final result Specimen: Nares from Nose Updated: 07/20/22 2222     SARS-CoV-2 Negative     INFLUENZA A PCR Negative     INFLUENZA B PCR Negative     RSV PCR Negative    Narrative:      FOR PEDIATRIC PATIENTS - copy/paste COVID Guidelines URL to browser: https://[employer] org/  ashx    SARS-CoV-2 assay is a Nucleic Acid Amplification assay intended for the  qualitative detection of nucleic acid from SARS-CoV-2 in nasopharyngeal  swabs  Results are for the presumptive identification of SARS-CoV-2 RNA  Positive results are indicative of infection with SARS-CoV-2, the virus  causing COVID-19, but do not rule out bacterial infection or co-infection  with other viruses  Laboratories within the [place] and its  territories are required to report all positive results to the appropriate  public health authorities   Negative results do not preclude SARS-CoV-2  infection and should not be used as the sole basis for treatment or other  patient management decisions  Negative results must be combined with  clinical observations, patient history, and epidemiological information  This test has not been FDA cleared or approved  This test has been authorized by FDA under an Emergency Use Authorization  (EUA)  This test is only authorized for the duration of time the  declaration that circumstances exist justifying the authorization of the  emergency use of an in vitro diagnostic tests for detection of SARS-CoV-2  virus and/or diagnosis of COVID-19 infection under section 564(b)(1) of  the Act, 21 U  S C  655XIM-4(D)(3), unless the authorization is terminated  or revoked sooner  The test has been validated but independent review by FDA  and CLIA is pending  Test performed using [employer] Gene[employer]pert: This RT-PCR assay targets N2,  a region unique to SARS-CoV-2  A conserved region in the E-gene was chosen  for pan-Sarbecovirus detection which includes SARS-CoV-2  HS Troponin I 2hr [[id number]]  (Normal) Collected: 07/20/22 2139    Lab Status: Final result Specimen: Blood from Arm, Right Updated: 07/20/22 2219     hs TnI 2hr 2 ng/L      Delta 2hr hsTnI 0 ng/L     D-dimer, quantitative [[id number]]  (Normal) Collected: 07/20/22 1938    Lab Status: Final result Specimen: Blood from Arm, Right Updated: 07/20/22 2131     D-Dimer, Quant <0 27 ug/ml FEU     Narrative: In the evaluation for possible pulmonary embolism, in the appropriate (Well's Score of 4 or less) patient, the age adjusted d-dimer cutoff for this patient can be calculated as:    Age x 0 01 (in ug/mL) for Age-adjusted D-dimer exclusion threshold for a patient over 50 years      Sedimentation rate, automated [[id number]]  (Normal) Collected: 07/20/22 1938    Lab Status: Final result Specimen: Blood from Arm, Right Updated: 07/20/22 2119     Sed Rate 7 mm/hour     HS Troponin 0hr (reflex protocol) [[id number]]  (Normal) Collected: 07/20/22 1938    Lab Status: Final result Specimen: Blood from Arm, Right Updated: 07/20/22 2013     hs TnI 0hr 2 ng/L     Comprehensive metabolic panel [[id number]] Collected: 07/20/22 1938    Lab Status: Final result Specimen: Blood from Arm, Right Updated: 07/20/22 2006     Sodium 137 mmol/L      Potassium 4 6 mmol/L      Chloride 103 mmol/L      CO2 27 mmol/L      ANION GAP 7 mmol/L      BUN 21 mg/dL      Creatinine 0 94 mg/dL      Glucose 112 mg/dL      Calcium 9 5 mg/dL      AST 24 U/L      ALT 36 U/L      Alkaline Phosphatase 62 U/L      Total Protein 7 8 g/dL      Albumin 4 2 g/dL      Total Bilirubin 0 59 mg/dL      eGFR 89 ml/min/1 73sq m     Narrative:      National Kidney Disease Foundation guidelines for Chronic Kidney Disease (CKD):     Stage 1 with normal or high GFR (GFR > 90 mL/min/1 73 square meters)    Stage 2 Mild CKD (GFR = 60-89 mL/min/1 73 square meters)    Stage 3A Moderate CKD (GFR = 45-59 mL/min/1 73 square meters)    Stage 3B Moderate CKD (GFR = 30-44 mL/min/1 73 square meters)    Stage 4 Severe CKD (GFR = 15-29 mL/min/1 73 square meters)    Stage 5 End Stage CKD (GFR <15 mL/min/1 73 square meters)  Note: GFR calculation is accurate only with a steady state creatinine    Lipase [[id number]]  (Normal) Collected: 07/20/22 1938    Lab Status: Final result Specimen: Blood from Arm, Right Updated: 07/20/22 2006     Lipase 61 u/L     CBC and differential [[id number]]  (Abnormal) Collected: 07/20/22 1938    Lab Status: Final result Specimen: Blood from Arm, Right Updated: 07/20/22 1949     WBC 10 88 Thousand/uL      RBC 5 02 Million/uL      Hemoglobin 16 0 g/dL      Hematocrit 46 1 %      MCV 92 fL      MCH 31 9 pg      MCHC 34 7 g/dL      RDW 12 7 %      MPV 9 6 fL      Platelets 565 Thousands/uL      nRBC 0 /100 WBCs      Neutrophils Relative 63 %      Immat GRANS % 1 %      Lymphocytes Relative 24 %      Monocytes Relative 10 % Eosinophils Relative 1 %      Basophils Relative 1 %      Neutrophils Absolute 7 01 Thousands/µL      Immature Grans Absolute 0 05 Thousand/uL      Lymphocytes Absolute 2 57 Thousands/µL      Monocytes Absolute 1 05 Thousand/µL      Eosinophils Absolute 0 15 Thousand/µL      Basophils Absolute 0 05 Thousands/µL                  CT head without contrast   Final Result by Sondra Irizarry MD (07/20 2225)      1  No acute intracranial abnormality  2   Fluid level containing frothy secretions in the right maxillary sinus  Correlate for sinusitis  The study was marked in [facility] for immediate notification  Workstation performed: [id number]         CT chest abdomen pelvis wo contrast   Final Result by Sondra Irizarry MD (07/20 2223)      1  No acute findings on this noncontrast study  2   2 mm left lower lobe nodule  Based on current Fleischner Society 2017 Guidelines on incidental pulmonary nodule, no routine follow-up is needed if the patient is low risk  If the patient is high risk, optional follow-up chest CT at 12 months can be    considered  3   Colonic diverticulosis without evidence of acute diverticulitis  4   Hepatic steatosis  Workstation performed: [id number]         XR chest 1 view portable   ED Interpretation by Sonny Ordaz PA-C (07/20 2131)   No acute findings       Final Result by Hunter Lau MD (07/21 9097)      No acute cardiopulmonary disease                    Workstation performed: [id number]                    Procedures  ECG 12 Lead Documentation Only    Date/Time: 7/20/2022 7:39 PM  Performed by: Sonny Ordaz PA-C  Authorized by: Sonny Ordaz PA-C     ECG reviewed by me, the ED Provider: yes    Patient location:  ED  Rate:     ECG rate:  84    ECG rate assessment: normal    Rhythm:     Rhythm: sinus rhythm    Ectopy:     Ectopy: none    QRS:     QRS axis:  Normal  Conduction:     Conduction: normal    ST segments:     ST segments:  Normal  T waves:     T waves: normal               ED Course  ED Course as of 07/22/22 0138 Wed Jul 20, 2022 2132 Per Radiology, there is a national shortage of IV contrast, all CT scans are to be ordered without contrast at this time unless otherwise indicated by radiologist for emergent situations including stroke alerts, rule out aortic dissection, trauma alerts, or high suspicion for pulmonary embolism  Will order CT without contrast at this time                HEART Risk Score    Flowsheet Row Most Recent Value   Heart Score Risk Calculator    History 0 Filed at: 07/22/2022 0136   ECG 0 Filed at: 07/22/2022 0136   Age 1 Filed at: 07/22/2022 0136   Risk Factors 1 Filed at: 07/22/2022 0136   Troponin 0 Filed at: 07/22/2022 0136   HEART Score 2 Filed at: 07/22/2022 0136                PERC Rule for PE    Flowsheet Row Most Recent Value   PERC Rule for PE    Age >=50 1 Filed at: 07/22/2022 0136   HR >=100 0 Filed at: 07/22/2022 0136   O2 Sat on room air < 95% 0 Filed at: 07/22/2022 0136   History of PE or DVT 0 Filed at: 07/22/2022 0136   Recent trauma or surgery 0 Filed at: 07/22/2022 0136   Hemoptysis 0 Filed at: 07/22/2022 0136   Exogenous estrogen 0 Filed at: 07/22/2022 0136   Unilateral leg swelling 0 Filed at: 07/22/2022 0136   PERC Rule for PE Results 1 Filed at: 07/22/2022 0136                  Wells' Criteria for PE    Flowsheet Row Most Recent Value   Wells' Criteria for PE    Clinical signs and symptoms of DVT 0 Filed at: 07/22/2022 9117   PE is primary diagnosis or equally likely 0 Filed at: 07/22/2022 0136   HR >100 0 Filed at: 07/22/2022 0136   Immobilization at least 3 days or Surgery in the previous 4 weeks 0 Filed at: 07/22/2022 0136   Previous, objectively diagnosed PE or DVT 0 Filed at: 07/22/2022 0136   Hemoptysis 0 Filed at: 07/22/2022 0136   Malignancy with treatment within 6 months or palliative 0 Filed at: 07/22/2022 6766   Elias' Criteria Total 0 Filed at: 07/22/2022 0136                [facility]  Number of Diagnoses or Management Options  Acute maxillary sinusitis, recurrence not specified: new and requires workup  Arthralgia, unspecified joint: new and requires workup  Elevated TSH: new and requires workup  Fatigue: new and requires workup  Pulmonary nodule: new and requires workup  Weakness: new and requires workup  Diagnosis management comments: Patient is here with multiple complaints  All of patient's labs and imaging were normal  Had extensive workup here in the ER and everything came back normal  Patient advised to follow up with his PCP and rheumatology  Return to the ER if anything acutely changes  Dr Cuong Medel evaluated the patient and agrees with assessment and plan       Amount and/or Complexity of Data Reviewed  Clinical lab tests: ordered and reviewed  Tests in the radiology section of CPT®: ordered and reviewed    Patient Progress  Patient progress: stable      Disposition  Final diagnoses:   Fatigue   Weakness   Pulmonary nodule   Arthralgia, unspecified joint   Elevated TSH   Acute maxillary sinusitis, recurrence not specified     Time reflects when diagnosis was documented in both MDM as applicable and the Disposition within this note     Time User Action Codes Description Comment    7/21/2022 12:02 AM Floyce Tez Add [R53 83] Fatigue     7/21/2022 12:02 AM Floyce Tez Add [R53 1] Weakness     7/21/2022 12:02 AM Floyce Tez Add [R91 1] Pulmonary nodule     7/21/2022 12:03 AM Floyce Tez Add [M25 50] Arthralgia, unspecified joint     7/21/2022 12:03 AM Floyce Tez Add [R79 89] Elevated TSH     7/21/2022 12:04 AM Floyce Tez Add [J01 00] Acute maxillary sinusitis, recurrence not specified       ED Disposition     ED Disposition   Discharge    Condition   Stable    Date/Time   Thu Jul 21, 2022 12:05 AM    Comment   Sharyle Outhouse PAUL [facility] discharge to home/self care                 Follow-up Information     Follow up With Specialties Details Why Contact Info Additional Information    Lin Holland,  Family Medicine Schedule an appointment as soon as possible for a visit   [address] [facility] [address]  [facility] Schedule an appointment as soon as possible for a visit   [address] [facility] [address] [address] [address] [facility], [address] [place], [facility] [place], [address]    [facility] Emergency Department Emergency Medicine  If symptoms worsen [address] [facility] [address] [facility] Emergency Department, [address], [facility] [place], [address]          Discharge Medication List as of 7/21/2022 12:06 AM      START taking these medications    Details   amoxicillin-clavulanate (AUGMENTIN) 875-125 mg per tablet Take 1 tablet by mouth every 12 (twelve) hours for 10 days, Starting u 7/21/2022, Until Sun 7/31/2022, Normal         CONTINUE these medications which have NOT CHANGED    Details   Ascorbic Acid (VITAMIN C PO) Take by mouth, Historical Med      aspirin (ECOTRIN LOW STRENGTH) 81 mg EC tablet Take 81 mg by mouth, Historical Med      Biotin 1 MG CAPS Take by mouth, Starting Tue 10/3/2017, Historical Med      cholecalciferol (VITAMIN D3) 1,000 units tablet Take 1 capsule by mouth daily , Starting Tue 10/3/2017, Historical Med      doxycycline hyclate (VIBRA-TABS) 100 mg tablet Take 1 tablet (100 mg total) by mouth 2 (two) times a day for 14 days, Starting Fri 7/15/2022, Until Fri 7/29/2022, Normal      esomeprazole (NexIUM) 20 mg capsule Take 20 mg by mouth 2 (two) times a day before meals, Historical Med      milk thistle 175 MG tablet Take 175 mg by mouth daily, Historical Med      Multiple Vitamin (MULTIVITAMIN) capsule Take 1 capsule by mouth daily, Historical Med      Potassium 75 MG TABS Take by mouth, Starting Tue 10/3/2017, Historical Med      sildenafil (VIAGRA) 100 mg tablet Take 1 tablet (100 mg total) by mouth daily as needed for erectile dysfunction, Starting Thu 10/21/2021, Normal      SUPER B COMPLEX/C PO Take 1 tablet by mouth daily, Starting Tue 10/3/2017, Historical Med             No discharge procedures on file      PDMP Review     None          ED Provider  Electronically Signed by           Rafi Leon PA-C  07/22/22 7839

## 2022-07-27 NOTE — TELEPHONE ENCOUNTER
John Vogel, please let patient/fiance know we will just put him on our cancellation list for new patients  However I do not see anything very urgent in the chart as his ESR and CRP are normal, so currently he is scheduled appropriately, but will keep in mind for cancellations since he requested 
Placed on DR BROOKS's cancellation list 
Placed on Dr Berry Russell cancellation list
Raul Stager  RE: Sooner appt from ER  CB: [phone] called stating patient was in ER on 7/20/22 and they were referred to Rheumatology and were told to get appt as soon as possible  He was given first available on 12/19/22  Is Dr Hari Becerril able to see patient sooner?   Caller asked to speak to clinical team 
Spoke with patient's fiance and relayed message per Dr Sinha Sensor, she verbalized understanding   Pt was placed on cancellation list 
none

## 2022-08-23 ENCOUNTER — TELEPHONE (OUTPATIENT)
Dept: PAIN MEDICINE | Facility: CLINIC | Age: 57
End: 2022-08-23

## 2022-08-23 NOTE — TELEPHONE ENCOUNTER
Provider leaving practice (Dr Margarito Brown)    [facility] for pt to cb and reschedule with another provider

## 2022-09-07 ENCOUNTER — APPOINTMENT (OUTPATIENT)
Dept: LAB | Facility: CLINIC | Age: 57
End: 2022-09-07
Payer: COMMERCIAL

## 2022-09-07 DIAGNOSIS — A68.1 TICK-BORNE RELAPSING FEVER: ICD-10-CM

## 2022-09-07 DIAGNOSIS — E55.9 VITAMIN D DEFICIENCY: ICD-10-CM

## 2022-09-07 DIAGNOSIS — R41.89 AKINETIC MUTISM: ICD-10-CM

## 2022-09-07 DIAGNOSIS — G93.32 CHRONIC FATIGUE SYNDROME: ICD-10-CM

## 2022-09-07 DIAGNOSIS — E53.8 VITAMIN B 12 DEFICIENCY: ICD-10-CM

## 2022-09-07 DIAGNOSIS — E07.89 HEMORRHAGE AND INFARCTION OF THYROID: ICD-10-CM

## 2022-09-07 DIAGNOSIS — E27.49 CORTICOSTERONE 18-MONOOXYGENASE DEFICIENCY (HCC): ICD-10-CM

## 2022-09-07 DIAGNOSIS — D84.9 IMMUNOSUPPRESSION-RELATED INFECTIOUS DISEASE (HCC): ICD-10-CM

## 2022-09-07 DIAGNOSIS — B99.8 IMMUNOSUPPRESSION-RELATED INFECTIOUS DISEASE (HCC): ICD-10-CM

## 2022-09-07 LAB
ALBUMIN SERPL BCP-MCNC: 3.3 G/DL (ref 3.5–5)
ALP SERPL-CCNC: 82 U/L (ref 46–116)
ALT SERPL W P-5'-P-CCNC: 53 U/L (ref 12–78)
AST SERPL W P-5'-P-CCNC: 27 U/L (ref 5–45)
BILIRUB DIRECT SERPL-MCNC: 0.12 MG/DL (ref 0–0.2)
BILIRUB SERPL-MCNC: 0.61 MG/DL (ref 0.2–1)
CRP SERPL HS-MCNC: 3.54 MG/L
ERYTHROCYTE [SEDIMENTATION RATE] IN BLOOD: 3 MM/HOUR (ref 0–19)
FIBRINOGEN PPP-MCNC: 263 MG/DL (ref 227–495)
GGT SERPL-CCNC: 35 U/L (ref 5–85)
IGA SERPL-MCNC: 375 MG/DL (ref 70–400)
IGG SERPL-MCNC: 885 MG/DL (ref 700–1600)
IGM SERPL-MCNC: 34 MG/DL (ref 40–230)
INSULIN SERPL-ACNC: 32.2 MU/L (ref 3–25)
MAGNESIUM SERPL-MCNC: 2 MG/DL (ref 1.6–2.6)
PROT SERPL-MCNC: 7 G/DL (ref 6.4–8.4)
T3FREE SERPL-MCNC: 3.08 PG/ML (ref 2.3–4.2)
T4 FREE SERPL-MCNC: 0.79 NG/DL (ref 0.76–1.46)
VIT B12 SERPL-MCNC: 405 PG/ML (ref 100–900)

## 2022-09-07 PROCEDURE — 82530 CORTISOL FREE: CPT

## 2022-09-07 PROCEDURE — 86141 C-REACTIVE PROTEIN HS: CPT

## 2022-09-07 PROCEDURE — 84305 ASSAY OF SOMATOMEDIN: CPT

## 2022-09-07 PROCEDURE — 86665 EPSTEIN-BARR CAPSID VCA: CPT

## 2022-09-07 PROCEDURE — 84140 ASSAY OF PREGNENOLONE: CPT

## 2022-09-07 PROCEDURE — 86663 EPSTEIN-BARR ANTIBODY: CPT

## 2022-09-07 PROCEDURE — 80076 HEPATIC FUNCTION PANEL: CPT

## 2022-09-07 PROCEDURE — 83735 ASSAY OF MAGNESIUM: CPT

## 2022-09-07 PROCEDURE — 84439 ASSAY OF FREE THYROXINE: CPT

## 2022-09-07 PROCEDURE — 86747 PARVOVIRUS ANTIBODY: CPT

## 2022-09-07 PROCEDURE — 86356 MONONUCLEAR CELL ANTIGEN: CPT

## 2022-09-07 PROCEDURE — 82977 ASSAY OF GGT: CPT

## 2022-09-07 PROCEDURE — 85652 RBC SED RATE AUTOMATED: CPT

## 2022-09-07 PROCEDURE — 82747 ASSAY OF FOLIC ACID RBC: CPT

## 2022-09-07 PROCEDURE — 82607 VITAMIN B-12: CPT

## 2022-09-07 PROCEDURE — 82306 VITAMIN D 25 HYDROXY: CPT

## 2022-09-07 PROCEDURE — 84482 T3 REVERSE: CPT

## 2022-09-07 PROCEDURE — 82024 ASSAY OF ACTH: CPT

## 2022-09-07 PROCEDURE — 85384 FIBRINOGEN ACTIVITY: CPT

## 2022-09-07 PROCEDURE — 36415 COLL VENOUS BLD VENIPUNCTURE: CPT

## 2022-09-07 PROCEDURE — 86359 T CELLS TOTAL COUNT: CPT

## 2022-09-07 PROCEDURE — 82784 ASSAY IGA/IGD/IGG/IGM EACH: CPT

## 2022-09-07 PROCEDURE — 83525 ASSAY OF INSULIN: CPT

## 2022-09-07 PROCEDURE — 84481 FREE ASSAY (FT-3): CPT

## 2022-09-07 PROCEDURE — 86376 MICROSOMAL ANTIBODY EACH: CPT

## 2022-09-07 PROCEDURE — 86645 CMV ANTIBODY IGM: CPT

## 2022-09-07 PROCEDURE — 88185 FLOWCYTOMETRY/TC ADD-ON: CPT

## 2022-09-07 PROCEDURE — 88184 FLOWCYTOMETRY/ TC 1 MARKER: CPT

## 2022-09-07 PROCEDURE — 86644 CMV ANTIBODY: CPT

## 2022-09-07 PROCEDURE — 82627 DEHYDROEPIANDROSTERONE: CPT

## 2022-09-07 PROCEDURE — 82533 TOTAL CORTISOL: CPT

## 2022-09-08 LAB
ACTH PLAS-MCNC: 46.5 PG/ML (ref 7.2–63.3)
B19V IGG SER IA-ACNC: 7 INDEX (ref 0–0.8)
B19V IGM SER IA-ACNC: 0.3 INDEX (ref 0–0.8)
DHEA-S SERPL-MCNC: 39.7 UG/DL (ref 48.9–344.2)
EBV EA IGG SER-ACNC: 26.7 U/ML (ref 0–8.9)
FOLATE BLD-MCNC: 557 NG/ML
FOLATE RBC-MCNC: 1216 NG/ML
HCT VFR BLD AUTO: 45.8 % (ref 37.5–51)
THYROPEROXIDASE AB SERPL-ACNC: 8 IU/ML (ref 0–34)

## 2022-09-09 LAB
IGF-I SERPL-MCNC: 92 NG/ML (ref 68–247)
MAGNESIUM RBC-MCNC: 4.6 MG/DL (ref 4.2–6.8)
MISCELLANEOUS LAB TEST RESULT: NORMAL
MISCELLANEOUS LAB TEST RESULT: NORMAL

## 2022-09-11 LAB — T3REVERSE SERPL-MCNC: 8.8 NG/DL (ref 9.2–24.1)

## 2022-09-12 LAB
MISCELLANEOUS LAB TEST RESULT: NORMAL
MISCELLANEOUS LAB TEST RESULT: NORMAL
PREG SERPL-MCNC: 10 NG/DL

## 2022-09-20 LAB — MISCELLANEOUS LAB TEST RESULT: NORMAL

## 2022-09-21 LAB
MISCELLANEOUS LAB TEST RESULT: NORMAL
MISCELLANEOUS LAB TEST RESULT: NORMAL

## 2023-01-18 DIAGNOSIS — J40 BRONCHITIS: Primary | ICD-10-CM

## 2023-01-18 RX ORDER — METHYLPREDNISOLONE 4 MG/1
TABLET ORAL
Qty: 21 EACH | Refills: 0 | Status: SHIPPED | OUTPATIENT
Start: 2023-01-18 | End: 2023-01-19 | Stop reason: SDUPTHER

## 2023-01-18 RX ORDER — AZITHROMYCIN 250 MG/1
TABLET, FILM COATED ORAL
Qty: 6 TABLET | Refills: 0 | Status: SHIPPED | OUTPATIENT
Start: 2023-01-18 | End: 2023-01-19 | Stop reason: SDUPTHER

## 2023-01-19 DIAGNOSIS — J40 BRONCHITIS: ICD-10-CM

## 2023-01-19 RX ORDER — AZITHROMYCIN 250 MG/1
TABLET, FILM COATED ORAL
Qty: 6 TABLET | Refills: 0 | Status: SHIPPED | OUTPATIENT
Start: 2023-01-19 | End: 2023-01-24

## 2023-01-19 RX ORDER — METHYLPREDNISOLONE 4 MG/1
TABLET ORAL
Qty: 21 EACH | Refills: 0 | Status: SHIPPED | OUTPATIENT
Start: 2023-01-19

## 2024-03-06 ENCOUNTER — TELEPHONE (OUTPATIENT)
Dept: FAMILY MEDICINE CLINIC | Facility: CLINIC | Age: 59
End: 2024-03-06

## 2024-07-23 DIAGNOSIS — L23.7 ALLERGIC CONTACT DERMATITIS DUE TO PLANTS, EXCEPT FOOD: Primary | ICD-10-CM

## 2024-07-23 RX ORDER — METHYLPREDNISOLONE 4 MG/1
TABLET ORAL
Qty: 21 EACH | Refills: 1 | Status: SHIPPED | OUTPATIENT
Start: 2024-07-23

## 2024-07-23 RX ORDER — TRIAMCINOLONE ACETONIDE 5 MG/G
CREAM TOPICAL 3 TIMES DAILY PRN
Qty: 30 G | Refills: 0 | Status: SHIPPED | OUTPATIENT
Start: 2024-07-23

## (undated) DEVICE — SINGLE-USE BIOPSY FORCEPS: Brand: RADIAL JAW 4